# Patient Record
Sex: FEMALE | Race: WHITE | URBAN - NONMETROPOLITAN AREA
[De-identification: names, ages, dates, MRNs, and addresses within clinical notes are randomized per-mention and may not be internally consistent; named-entity substitution may affect disease eponyms.]

---

## 2017-01-16 ENCOUNTER — DOCTOR'S OFFICE (OUTPATIENT)
Dept: URBAN - NONMETROPOLITAN AREA CLINIC 1 | Facility: CLINIC | Age: 57
Setting detail: OPHTHALMOLOGY
End: 2017-01-16
Payer: COMMERCIAL

## 2017-01-16 DIAGNOSIS — H26.493: ICD-10-CM

## 2017-01-16 DIAGNOSIS — H52.4: ICD-10-CM

## 2017-01-16 DIAGNOSIS — H40.003: ICD-10-CM

## 2017-01-16 DIAGNOSIS — Z96.1: ICD-10-CM

## 2017-01-16 PROCEDURE — 92015 DETERMINE REFRACTIVE STATE: CPT | Performed by: OPTOMETRIST

## 2017-01-16 PROCEDURE — 92014 COMPRE OPH EXAM EST PT 1/>: CPT | Performed by: OPTOMETRIST

## 2017-01-16 ASSESSMENT — REFRACTION_OUTSIDERX
OS_VA2: 20/25
OS_ADD: +2.50
OD_VA1: 20/25+2
OD_ADD: +2.50
OD_VA3: 20/
OD_SPHERE: -0.25
OS_SPHERE: PLANO
OS_VA3: 20/
OS_VA1: 20/25
OD_CYLINDER: -1.25
OU_VA: 20/
OS_CYLINDER: -0.50
OS_AXIS: 090
OD_AXIS: 105
OD_VA2: 20/25

## 2017-01-16 ASSESSMENT — REFRACTION_AUTOREFRACTION
OD_CYLINDER: -1.25
OD_SPHERE: -0.25
OS_CYLINDER: 0.00
OS_SPHERE: -0.50
OD_AXIS: 113

## 2017-01-16 ASSESSMENT — REFRACTION_MANIFEST
OD_VA1: 20/
OU_VA: 20/
OS_VA1: 20/
OD_VA1: 20/
OD_VA2: 20/
OS_VA3: 20/
OS_VA2: 20/
OD_VA3: 20/
OS_VA2: 20/
OS_VA3: 20/
OD_VA3: 20/
OU_VA: 20/
OD_VA2: 20/
OS_VA1: 20/

## 2017-01-16 ASSESSMENT — REFRACTION_CURRENTRX
OS_VPRISM_DIRECTION: PROGS
OD_ADD: +2.50
OD_OVR_VA: 20/
OS_SPHERE: 0.00
OS_OVR_VA: 20/
OD_OVR_VA: 20/
OD_SPHERE: -0.50
OD_AXIS: 120
OS_OVR_VA: 20/
OS_ADD: +2.50
OS_OVR_VA: 20/
OD_OVR_VA: 20/
OD_VPRISM_DIRECTION: PROGS
OS_CYLINDER: -0.25
OD_CYLINDER: -0.50
OS_AXIS: 111

## 2017-01-16 ASSESSMENT — VISUAL ACUITY
OD_BCVA: 20/25
OS_BCVA: 20/30

## 2017-01-16 ASSESSMENT — CONFRONTATIONAL VISUAL FIELD TEST (CVF)
OD_FINDINGS: FULL
OS_FINDINGS: FULL

## 2017-01-16 ASSESSMENT — SPHEQUIV_DERIVED
OD_SPHEQUIV: -0.875
OS_SPHEQUIV: -0.5

## 2017-01-17 ENCOUNTER — OPTICAL OFFICE (OUTPATIENT)
Dept: URBAN - NONMETROPOLITAN AREA CLINIC 4 | Facility: CLINIC | Age: 57
Setting detail: OPHTHALMOLOGY
End: 2017-01-17
Payer: COMMERCIAL

## 2017-01-17 DIAGNOSIS — H52.223: ICD-10-CM

## 2017-01-17 PROCEDURE — V2020 VISION SVCS FRAMES PURCHASES: HCPCS | Performed by: OPTOMETRIST

## 2017-01-17 PROCEDURE — V2781 PROGRESSIVE LENS PER LENS: HCPCS | Performed by: OPTOMETRIST

## 2018-01-17 ENCOUNTER — DOCTOR'S OFFICE (OUTPATIENT)
Dept: URBAN - NONMETROPOLITAN AREA CLINIC 1 | Facility: CLINIC | Age: 58
Setting detail: OPHTHALMOLOGY
End: 2018-01-17
Payer: COMMERCIAL

## 2018-01-17 DIAGNOSIS — H04.123: ICD-10-CM

## 2018-01-17 DIAGNOSIS — H52.4: ICD-10-CM

## 2018-01-17 DIAGNOSIS — Z96.1: ICD-10-CM

## 2018-01-17 DIAGNOSIS — H26.493: ICD-10-CM

## 2018-01-17 DIAGNOSIS — B02.9: ICD-10-CM

## 2018-01-17 DIAGNOSIS — H40.003: ICD-10-CM

## 2018-01-17 PROCEDURE — 92015 DETERMINE REFRACTIVE STATE: CPT | Performed by: OPTOMETRIST

## 2018-01-17 PROCEDURE — 92014 COMPRE OPH EXAM EST PT 1/>: CPT | Performed by: OPTOMETRIST

## 2018-01-17 PROCEDURE — 92133 CPTRZD OPH DX IMG PST SGM ON: CPT | Performed by: OPTOMETRIST

## 2018-01-17 ASSESSMENT — REFRACTION_MANIFEST
OD_VA2: 20/
OD_VA3: 20/
OS_VA2: 20/
OD_VA2: 20/
OS_VA3: 20/
OS_VA1: 20/
OD_VA1: 20/
OU_VA: 20/
OS_VA3: 20/
OD_VA1: 20/
OS_VA2: 20/
OS_VA1: 20/
OD_VA3: 20/
OU_VA: 20/

## 2018-01-17 ASSESSMENT — REFRACTION_OUTSIDERX
OS_SPHERE: PLANO
OD_SPHERE: -0.25
OD_CYLINDER: -1.25
OD_VA1: 20/25+2
OD_VA3: 20/
OS_VA3: 20/
OD_ADD: +2.50
OD_AXIS: 105
OS_VA1: 20/25
OS_VA2: 20/25
OS_ADD: +2.50
OS_AXIS: 090
OU_VA: 20/
OD_VA2: 20/25
OS_CYLINDER: -0.50

## 2018-01-17 ASSESSMENT — VISUAL ACUITY
OS_BCVA: 20/30
OD_BCVA: 20/25

## 2018-01-17 ASSESSMENT — REFRACTION_CURRENTRX
OD_CYLINDER: -0.50
OD_AXIS: 120
OS_OVR_VA: 20/
OS_SPHERE: 0.00
OS_OVR_VA: 20/
OS_AXIS: 111
OD_OVR_VA: 20/
OS_OVR_VA: 20/
OD_SPHERE: -0.50
OS_CYLINDER: -0.25
OD_OVR_VA: 20/
OD_ADD: +2.50
OD_OVR_VA: 20/
OD_VPRISM_DIRECTION: PROGS
OS_ADD: +2.50
OS_VPRISM_DIRECTION: PROGS

## 2018-01-17 ASSESSMENT — REFRACTION_AUTOREFRACTION
OD_SPHERE: -0.25
OS_CYLINDER: SPH
OD_AXIS: 103
OS_SPHERE: -0.25
OD_CYLINDER: -1.00

## 2018-01-17 ASSESSMENT — SUPERFICIAL PUNCTATE KERATITIS (SPK)
OD_SPK: T
OS_SPK: T 1+

## 2018-01-17 ASSESSMENT — CONFRONTATIONAL VISUAL FIELD TEST (CVF)
OS_FINDINGS: FULL
OD_FINDINGS: FULL

## 2018-01-17 ASSESSMENT — TEAR BREAK UP TIME (TBUT)
OS_TBUT: 5-6 SEC
OD_TBUT: 6-7 SEC

## 2018-01-17 ASSESSMENT — SPHEQUIV_DERIVED: OD_SPHEQUIV: -0.75

## 2019-01-18 ENCOUNTER — DOCTOR'S OFFICE (OUTPATIENT)
Dept: URBAN - NONMETROPOLITAN AREA CLINIC 1 | Facility: CLINIC | Age: 59
Setting detail: OPHTHALMOLOGY
End: 2019-01-18
Payer: COMMERCIAL

## 2019-01-18 DIAGNOSIS — Z96.1: ICD-10-CM

## 2019-01-18 DIAGNOSIS — H26.493: ICD-10-CM

## 2019-01-18 DIAGNOSIS — H01.001: ICD-10-CM

## 2019-01-18 DIAGNOSIS — H04.123: ICD-10-CM

## 2019-01-18 DIAGNOSIS — H01.004: ICD-10-CM

## 2019-01-18 DIAGNOSIS — B02.9: ICD-10-CM

## 2019-01-18 DIAGNOSIS — H40.003: ICD-10-CM

## 2019-01-18 PROCEDURE — 92014 COMPRE OPH EXAM EST PT 1/>: CPT | Performed by: OPTOMETRIST

## 2019-01-18 PROCEDURE — 76514 ECHO EXAM OF EYE THICKNESS: CPT | Performed by: OPTOMETRIST

## 2019-01-18 PROCEDURE — 92083 EXTENDED VISUAL FIELD XM: CPT | Performed by: OPTOMETRIST

## 2019-01-18 ASSESSMENT — TEAR BREAK UP TIME (TBUT)
OS_TBUT: 5-6 SEC
OD_TBUT: 6-7 SEC

## 2019-01-18 ASSESSMENT — REFRACTION_MANIFEST
OD_VA1: 20/25+2
OD_AXIS: 105
OD_VA3: 20/
OS_SPHERE: PLANO
OU_VA: 20/
OD_SPHERE: -0.25
OS_VA2: 20/25
OD_VA1: 20/
OS_VA2: 20/
OS_ADD: +2.50
OS_VA3: 20/
OD_CYLINDER: -1.25
OS_CYLINDER: -0.50
OD_VA3: 20/
OS_VA1: 20/
OD_ADD: +2.50
OS_AXIS: 090
OD_VA2: 20/
OD_VA2: 20/25
OS_VA3: 20/
OS_VA1: 20/25
OU_VA: 20/

## 2019-01-18 ASSESSMENT — REFRACTION_AUTOREFRACTION
OS_AXIS: 075
OD_SPHERE: -0.75
OS_CYLINDER: -0.25
OD_AXIS: 113
OD_CYLINDER: -0.75
OS_SPHERE: -0.50

## 2019-01-18 ASSESSMENT — PACHYMETRY
OD_CT_UM: 593
OS_CT_UM: 600
OS_CT_CORRECTION: -4
OD_CT_CORRECTION: -4

## 2019-01-18 ASSESSMENT — REFRACTION_CURRENTRX
OS_CYLINDER: -0.25
OS_AXIS: 111
OS_OVR_VA: 20/
OS_ADD: +2.50
OS_OVR_VA: 20/
OD_CYLINDER: -0.50
OS_OVR_VA: 20/
OD_AXIS: 120
OD_VPRISM_DIRECTION: PROGS
OD_ADD: +2.50
OD_SPHERE: -0.50
OD_OVR_VA: 20/
OD_OVR_VA: 20/
OS_VPRISM_DIRECTION: PROGS
OD_OVR_VA: 20/
OS_SPHERE: 0.00

## 2019-01-18 ASSESSMENT — LID EXAM ASSESSMENTS
OD_BLEPHARITIS: RUL 1+
OS_BLEPHARITIS: LUL 1+

## 2019-01-18 ASSESSMENT — SPHEQUIV_DERIVED
OD_SPHEQUIV: -1.125
OS_SPHEQUIV: -0.625
OD_SPHEQUIV: -0.875

## 2019-01-18 ASSESSMENT — VISUAL ACUITY
OS_BCVA: 20/30-2
OD_BCVA: 20/25-2

## 2019-01-18 ASSESSMENT — SUPERFICIAL PUNCTATE KERATITIS (SPK)
OD_SPK: T
OS_SPK: T 1+

## 2019-01-18 ASSESSMENT — CONFRONTATIONAL VISUAL FIELD TEST (CVF)
OD_FINDINGS: FULL
OS_FINDINGS: FULL

## 2020-01-21 ENCOUNTER — DOCTOR'S OFFICE (OUTPATIENT)
Dept: URBAN - NONMETROPOLITAN AREA CLINIC 1 | Facility: CLINIC | Age: 60
Setting detail: OPHTHALMOLOGY
End: 2020-01-21
Payer: COMMERCIAL

## 2020-01-21 DIAGNOSIS — H52.4: ICD-10-CM

## 2020-01-21 DIAGNOSIS — H40.013: ICD-10-CM

## 2020-01-21 PROCEDURE — 92133 CPTRZD OPH DX IMG PST SGM ON: CPT | Performed by: OPTOMETRIST

## 2020-01-21 PROCEDURE — 92015 DETERMINE REFRACTIVE STATE: CPT | Performed by: OPTOMETRIST

## 2020-01-21 PROCEDURE — 92014 COMPRE OPH EXAM EST PT 1/>: CPT | Performed by: OPTOMETRIST

## 2020-01-21 ASSESSMENT — REFRACTION_CURRENTRX
OS_OVR_VA: 20/
OD_OVR_VA: 20/
OS_AXIS: 111
OS_VPRISM_DIRECTION: PROGS
OS_ADD: +2.50
OD_AXIS: 120
OD_VPRISM_DIRECTION: PROGS
OD_ADD: +2.50
OD_SPHERE: -0.50
OS_SPHERE: PLANO
OS_CYLINDER: -0.25
OD_CYLINDER: -0.50

## 2020-01-21 ASSESSMENT — REFRACTION_MANIFEST
OS_ADD: +2.50
OS_VA2: 20/25
OS_VA1: 20/25
OD_ADD: +2.50
OU_VA: 20/
OD_CYLINDER: -1.50
OD_VA3: 20/
OD_AXIS: 110
OS_VA3: 20/
OD_SPHERE: PLANO
OD_VA2: 20/25
OD_VA1: 20/25+2
OS_SPHERE: PLANO
OS_CYLINDER: -0.75
OS_AXIS: 065

## 2020-01-21 ASSESSMENT — SUPERFICIAL PUNCTATE KERATITIS (SPK)
OD_SPK: T
OS_SPK: T 1+

## 2020-01-21 ASSESSMENT — LID EXAM ASSESSMENTS
OD_BLEPHARITIS: RUL 1+
OS_BLEPHARITIS: LUL 1+

## 2020-01-21 ASSESSMENT — REFRACTION_AUTOREFRACTION
OD_CYLINDER: -1.50
OS_SPHERE: 0.00
OD_AXIS: 113
OD_SPHERE: 0.00
OS_CYLINDER: -0.75
OS_AXIS: 064

## 2020-01-21 ASSESSMENT — SPHEQUIV_DERIVED
OD_SPHEQUIV: -0.75
OS_SPHEQUIV: -0.375

## 2020-01-21 ASSESSMENT — TEAR BREAK UP TIME (TBUT)
OS_TBUT: 5-6 SEC
OD_TBUT: 6-7 SEC

## 2020-01-21 ASSESSMENT — VISUAL ACUITY
OD_BCVA: 20/30-2
OS_BCVA: 20/20-2

## 2020-01-21 ASSESSMENT — CONFRONTATIONAL VISUAL FIELD TEST (CVF)
OD_FINDINGS: FULL
OS_FINDINGS: FULL

## 2021-01-29 ENCOUNTER — DOCTOR'S OFFICE (OUTPATIENT)
Dept: URBAN - NONMETROPOLITAN AREA CLINIC 1 | Facility: CLINIC | Age: 61
Setting detail: OPHTHALMOLOGY
End: 2021-01-29
Payer: COMMERCIAL

## 2021-01-29 DIAGNOSIS — H01.001: ICD-10-CM

## 2021-01-29 DIAGNOSIS — H04.123: ICD-10-CM

## 2021-01-29 DIAGNOSIS — H40.013: ICD-10-CM

## 2021-01-29 DIAGNOSIS — H01.004: ICD-10-CM

## 2021-01-29 DIAGNOSIS — Z96.1: ICD-10-CM

## 2021-01-29 DIAGNOSIS — B02.9: ICD-10-CM

## 2021-01-29 DIAGNOSIS — H26.493: ICD-10-CM

## 2021-01-29 PROCEDURE — 92014 COMPRE OPH EXAM EST PT 1/>: CPT | Performed by: OPTOMETRIST

## 2021-01-29 ASSESSMENT — REFRACTION_MANIFEST
OD_CYLINDER: -1.50
OS_AXIS: 065
OS_VA2: 20/25
OD_VA2: 20/25
OD_AXIS: 110
OS_SPHERE: PLANO
OS_CYLINDER: -0.75
OS_ADD: +2.50
OD_SPHERE: PLANO
OS_VA1: 20/25
OD_VA1: 20/25+2
OD_ADD: +2.50

## 2021-01-29 ASSESSMENT — REFRACTION_CURRENTRX
OD_OVR_VA: 20/
OS_ADD: +2.50
OD_AXIS: 110
OS_SPHERE: PLANO
OD_VPRISM_DIRECTION: PROGS
OD_ADD: +2.50
OS_OVR_VA: 20/
OS_VPRISM_DIRECTION: PROGS
OD_SPHERE: PLANO
OD_CYLINDER: -1.50
OS_AXIS: 065
OS_CYLINDER: -0.75

## 2021-01-29 ASSESSMENT — PACHYMETRY
OS_CT_CORRECTION: -4
OS_CT_UM: 600
OD_CT_CORRECTION: -4
OD_CT_UM: 593

## 2021-01-29 ASSESSMENT — TONOMETRY
OS_IOP_MMHG: 17
OD_IOP_MMHG: 17

## 2021-01-29 ASSESSMENT — LID EXAM ASSESSMENTS
OS_BLEPHARITIS: LUL 1+
OD_BLEPHARITIS: RUL 1+

## 2021-01-29 ASSESSMENT — VISUAL ACUITY
OD_BCVA: 20/30-2
OS_BCVA: 20/40-2

## 2021-01-29 ASSESSMENT — REFRACTION_AUTOREFRACTION
OS_AXIS: 064
OD_CYLINDER: -1.50
OS_CYLINDER: -0.75
OD_SPHERE: 0.00
OD_AXIS: 113
OS_SPHERE: 0.00

## 2021-01-29 ASSESSMENT — SPHEQUIV_DERIVED
OD_SPHEQUIV: -0.75
OS_SPHEQUIV: -0.375

## 2021-01-29 ASSESSMENT — TEAR BREAK UP TIME (TBUT)
OS_TBUT: 5-6 SEC
OD_TBUT: 6-7 SEC

## 2021-01-29 ASSESSMENT — CONFRONTATIONAL VISUAL FIELD TEST (CVF)
OS_FINDINGS: FULL
OD_FINDINGS: FULL

## 2021-01-29 ASSESSMENT — SUPERFICIAL PUNCTATE KERATITIS (SPK)
OS_SPK: T 1+
OD_SPK: T

## 2021-08-18 ENCOUNTER — APPOINTMENT (EMERGENCY)
Dept: CT IMAGING | Facility: HOSPITAL | Age: 61
End: 2021-08-18

## 2021-08-18 ENCOUNTER — HOSPITAL ENCOUNTER (EMERGENCY)
Facility: HOSPITAL | Age: 61
Discharge: HOME | End: 2021-08-18
Attending: EMERGENCY MEDICINE | Admitting: EMERGENCY MEDICINE

## 2021-08-18 VITALS
RESPIRATION RATE: 18 BRPM | BODY MASS INDEX: 36.51 KG/M2 | TEMPERATURE: 97.4 F | SYSTOLIC BLOOD PRESSURE: 157 MMHG | WEIGHT: 219.14 LBS | OXYGEN SATURATION: 97 % | DIASTOLIC BLOOD PRESSURE: 82 MMHG | HEIGHT: 65 IN | HEART RATE: 64 BPM

## 2021-08-18 DIAGNOSIS — R07.81 RIB PAIN ON RIGHT SIDE: Primary | ICD-10-CM

## 2021-08-18 PROCEDURE — 71260 CT THORAX DX C+: CPT

## 2021-08-18 PROCEDURE — 99284 EMERGENCY DEPT VISIT MOD MDM: CPT

## 2021-08-18 PROCEDURE — 74177 CT ABD & PELVIS W/CONTRAST: CPT

## 2021-08-18 PROCEDURE — 96374 THER/PROPH/DIAG INJ IV PUSH: CPT

## 2021-08-18 PROCEDURE — 99285 EMERGENCY DEPT VISIT HI MDM: CPT | Performed by: EMERGENCY MEDICINE

## 2021-08-18 RX ORDER — MORPHINE SULFATE 15 MG/1
15 TABLET ORAL EVERY 8 HOURS PRN
Qty: 6 TABLET | Refills: 0 | Status: SHIPPED | OUTPATIENT
Start: 2021-08-18

## 2021-08-18 RX ORDER — URSODIOL 250 MG/1
TABLET, FILM COATED ORAL
COMMUNITY

## 2021-08-18 RX ORDER — MORPHINE SULFATE 4 MG/ML
4 INJECTION, SOLUTION INTRAMUSCULAR; INTRAVENOUS ONCE
Status: COMPLETED | OUTPATIENT
Start: 2021-08-18 | End: 2021-08-18

## 2021-08-18 RX ORDER — MORPHINE SULFATE 15 MG/1
15 TABLET ORAL ONCE
Status: COMPLETED | OUTPATIENT
Start: 2021-08-18 | End: 2021-08-18

## 2021-08-18 RX ORDER — ESCITALOPRAM OXALATE 10 MG/1
TABLET ORAL
COMMUNITY

## 2021-08-18 RX ADMIN — MORPHINE SULFATE 15 MG: 15 TABLET ORAL at 20:17

## 2021-08-18 RX ADMIN — MORPHINE SULFATE 4 MG: 4 INJECTION INTRAVENOUS at 19:27

## 2021-08-18 RX ADMIN — IOHEXOL 100 ML: 350 INJECTION, SOLUTION INTRAVENOUS at 19:46

## 2021-08-18 NOTE — Clinical Note
Dwaine Nathan was seen and treated in our emergency department on 8/18/2021  Diagnosis:     Amrit Burk  may return to work on return date  She may return on this date: 08/23/2021         If you have any questions or concerns, please don't hesitate to call        Darek Leo, DO    ______________________________           _______________          _______________  Hospital Representative                              Date                                Time
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
## 2021-08-19 NOTE — DISCHARGE INSTRUCTIONS
Rib Injury without definite fracture  Return immediately if worse or any new symptoms  Tylenol 1000 mg every 6 hours as needed  and/or  Advil 400 mg every 6 hours as needed  May take both together  Ice 20 minutes hourly as needed

## 2022-01-31 ENCOUNTER — DOCTOR'S OFFICE (OUTPATIENT)
Dept: URBAN - NONMETROPOLITAN AREA CLINIC 1 | Facility: CLINIC | Age: 62
Setting detail: OPHTHALMOLOGY
End: 2022-01-31
Payer: COMMERCIAL

## 2022-01-31 DIAGNOSIS — Z96.1: ICD-10-CM

## 2022-01-31 DIAGNOSIS — H01.004: ICD-10-CM

## 2022-01-31 DIAGNOSIS — H04.123: ICD-10-CM

## 2022-01-31 DIAGNOSIS — H01.001: ICD-10-CM

## 2022-01-31 DIAGNOSIS — H52.4: ICD-10-CM

## 2022-01-31 DIAGNOSIS — H40.013: ICD-10-CM

## 2022-01-31 DIAGNOSIS — B02.9: ICD-10-CM

## 2022-01-31 DIAGNOSIS — H26.493: ICD-10-CM

## 2022-01-31 PROCEDURE — 92014 COMPRE OPH EXAM EST PT 1/>: CPT | Performed by: OPTOMETRIST

## 2022-01-31 PROCEDURE — 92083 EXTENDED VISUAL FIELD XM: CPT | Performed by: OPTOMETRIST

## 2022-01-31 PROCEDURE — 76514 ECHO EXAM OF EYE THICKNESS: CPT | Performed by: OPTOMETRIST

## 2022-01-31 ASSESSMENT — REFRACTION_MANIFEST
OD_AXIS: 110
OS_SPHERE: PLANO
OS_AXIS: 065
OD_VA2: 20/25
OD_SPHERE: PLANO
OD_VA1: 20/25+2
OD_ADD: +2.50
OS_VA2: 20/25
OS_VA1: 20/25
OD_CYLINDER: -1.50
OS_ADD: +2.50
OS_CYLINDER: -0.75

## 2022-01-31 ASSESSMENT — VISUAL ACUITY
OD_BCVA: 20/25+2
OS_BCVA: 20/20

## 2022-01-31 ASSESSMENT — TONOMETRY
OS_IOP_MMHG: 17
OD_IOP_MMHG: 17

## 2022-01-31 ASSESSMENT — REFRACTION_AUTOREFRACTION
OS_SPHERE: +1.00
OS_CYLINDER: -1.00
OD_CYLINDER: -1.50
OD_AXIS: 132
OD_SPHERE: +1.50
OS_AXIS: 109

## 2022-01-31 ASSESSMENT — TEAR BREAK UP TIME (TBUT)
OS_TBUT: 5-6 SEC
OD_TBUT: 6-7 SEC

## 2022-01-31 ASSESSMENT — REFRACTION_CURRENTRX
OS_OVR_VA: 20/
OD_OVR_VA: 20/
OD_VPRISM_DIRECTION: PROGS
OS_SPHERE: PLANO
OD_AXIS: 110
OS_AXIS: 065
OS_VPRISM_DIRECTION: PROGS
OS_CYLINDER: -0.75
OS_ADD: +2.50
OD_ADD: +2.50
OD_SPHERE: PLANO
OD_CYLINDER: -1.50

## 2022-01-31 ASSESSMENT — LID EXAM ASSESSMENTS
OS_BLEPHARITIS: LUL 1+
OD_BLEPHARITIS: RUL 1+

## 2022-01-31 ASSESSMENT — CONFRONTATIONAL VISUAL FIELD TEST (CVF)
OS_FINDINGS: FULL
OD_FINDINGS: FULL

## 2022-01-31 ASSESSMENT — PACHYMETRY
OS_CT_CORRECTION: -4
OD_CT_UM: 593
OD_CT_CORRECTION: -4
OS_CT_UM: 600

## 2022-01-31 ASSESSMENT — SUPERFICIAL PUNCTATE KERATITIS (SPK)
OD_SPK: T
OS_SPK: T 1+

## 2022-01-31 ASSESSMENT — SPHEQUIV_DERIVED
OS_SPHEQUIV: 0.5
OD_SPHEQUIV: 0.75

## 2022-02-14 ENCOUNTER — HOSPITAL ENCOUNTER (OUTPATIENT)
Dept: RADIOLOGY | Facility: HOSPITAL | Age: 62
Discharge: HOME | End: 2022-02-14
Payer: COMMERCIAL

## 2022-02-14 DIAGNOSIS — K62.89 CHRONIC IDIOPATHIC ANAL PAIN: ICD-10-CM

## 2022-02-14 DIAGNOSIS — G89.29 CHRONIC PAIN OF RIGHT KNEE: ICD-10-CM

## 2022-02-14 DIAGNOSIS — M25.561 CHRONIC PAIN OF RIGHT KNEE: ICD-10-CM

## 2022-02-14 DIAGNOSIS — G89.29 CHRONIC IDIOPATHIC ANAL PAIN: ICD-10-CM

## 2022-02-14 PROCEDURE — 73562 X-RAY EXAM OF KNEE 3: CPT

## 2023-02-06 ENCOUNTER — DOCTOR'S OFFICE (OUTPATIENT)
Dept: URBAN - NONMETROPOLITAN AREA CLINIC 1 | Facility: CLINIC | Age: 63
Setting detail: OPHTHALMOLOGY
End: 2023-02-06
Payer: COMMERCIAL

## 2023-02-06 DIAGNOSIS — Z96.1: ICD-10-CM

## 2023-02-06 DIAGNOSIS — H01.001: ICD-10-CM

## 2023-02-06 DIAGNOSIS — H26.493: ICD-10-CM

## 2023-02-06 DIAGNOSIS — H40.013: ICD-10-CM

## 2023-02-06 DIAGNOSIS — H04.123: ICD-10-CM

## 2023-02-06 DIAGNOSIS — H01.004: ICD-10-CM

## 2023-02-06 PROCEDURE — 92133 CPTRZD OPH DX IMG PST SGM ON: CPT | Performed by: OPTOMETRIST

## 2023-02-06 PROCEDURE — 92014 COMPRE OPH EXAM EST PT 1/>: CPT | Performed by: OPTOMETRIST

## 2023-02-06 ASSESSMENT — CONFRONTATIONAL VISUAL FIELD TEST (CVF)
OD_FINDINGS: FULL
OS_FINDINGS: FULL

## 2023-02-06 ASSESSMENT — SPHEQUIV_DERIVED
OS_SPHEQUIV: -1.625
OD_SPHEQUIV: -1.125

## 2023-02-06 ASSESSMENT — REFRACTION_AUTOREFRACTION
OS_SPHERE: -1.25
OD_CYLINDER: -1.25
OS_CYLINDER: -0.75
OD_SPHERE: -0.50
OS_AXIS: 087
OD_AXIS: 113

## 2023-02-06 ASSESSMENT — SUPERFICIAL PUNCTATE KERATITIS (SPK)
OD_SPK: T
OS_SPK: T 1+

## 2023-02-06 ASSESSMENT — REFRACTION_MANIFEST
OD_AXIS: 110
OS_SPHERE: PLANO
OD_ADD: +2.50
OS_CYLINDER: -0.75
OS_ADD: +2.50
OS_VA1: 20/25
OD_CYLINDER: -1.50
OS_AXIS: 065
OD_VA1: 20/25+2
OD_VA2: 20/25
OD_SPHERE: PLANO
OS_VA2: 20/25

## 2023-02-06 ASSESSMENT — REFRACTION_CURRENTRX
OD_VPRISM_DIRECTION: PROGS
OS_AXIS: 065
OD_AXIS: 110
OS_CYLINDER: -0.75
OS_VPRISM_DIRECTION: PROGS
OS_SPHERE: PLANO
OS_OVR_VA: 20/
OD_OVR_VA: 20/
OD_SPHERE: PLANO
OD_CYLINDER: -1.50
OS_ADD: +2.50
OD_ADD: +2.50

## 2023-02-06 ASSESSMENT — TONOMETRY
OD_IOP_MMHG: 15
OS_IOP_MMHG: 16

## 2023-02-06 ASSESSMENT — PACHYMETRY
OS_CT_CORRECTION: -4
OD_CT_CORRECTION: -4
OS_CT_UM: 600
OD_CT_UM: 593

## 2023-02-06 ASSESSMENT — LID EXAM ASSESSMENTS
OD_BLEPHARITIS: RUL T 1+
OS_BLEPHARITIS: LUL T 1+

## 2023-02-06 ASSESSMENT — TEAR BREAK UP TIME (TBUT)
OD_TBUT: 6-7 SEC
OS_TBUT: 5-6 SEC

## 2023-02-06 ASSESSMENT — VISUAL ACUITY
OS_BCVA: 20/30-2
OD_BCVA: 20/40-1

## 2023-04-05 ENCOUNTER — DOCTOR'S OFFICE (OUTPATIENT)
Dept: URBAN - NONMETROPOLITAN AREA CLINIC 1 | Facility: CLINIC | Age: 63
Setting detail: OPHTHALMOLOGY
End: 2023-04-05
Payer: COMMERCIAL

## 2023-04-05 ENCOUNTER — RX ONLY (RX ONLY)
Age: 63
End: 2023-04-05

## 2023-04-05 DIAGNOSIS — H10.503: ICD-10-CM

## 2023-04-05 DIAGNOSIS — H04.123: ICD-10-CM

## 2023-04-05 DIAGNOSIS — H01.004: ICD-10-CM

## 2023-04-05 DIAGNOSIS — H01.001: ICD-10-CM

## 2023-04-05 DIAGNOSIS — Z96.1: ICD-10-CM

## 2023-04-05 PROCEDURE — 92012 INTRM OPH EXAM EST PATIENT: CPT | Performed by: OPHTHALMOLOGY

## 2023-04-05 RX ORDER — TOBRAMYCIN AND DEXAMETHASONE 1; 3 MG/ML; MG/ML
SUSPENSION/ DROPS OPHTHALMIC
Qty: 1 | Refills: 1 | Status: ACTIVE | OUTPATIENT

## 2023-04-05 RX ORDER — DOXYCYCLINE HYCLATE 20 MG/1
TABLET, FILM COATED ORAL
Qty: 60 | Refills: 1 | Status: ACTIVE | OUTPATIENT

## 2023-04-05 ASSESSMENT — VISUAL ACUITY
OD_BCVA: 20/60
OS_BCVA: 20/40-1

## 2023-04-05 ASSESSMENT — LID EXAM ASSESSMENTS
OD_BLEPHARITIS: RUL T 1+
OS_BLEPHARITIS: LUL T 1+

## 2023-04-05 ASSESSMENT — REFRACTION_CURRENTRX
OD_CYLINDER: -1.50
OS_SPHERE: PLANO
OD_OVR_VA: 20/
OS_VPRISM_DIRECTION: PROGS
OS_ADD: +2.50
OS_OVR_VA: 20/
OD_SPHERE: PLANO
OD_ADD: +2.50
OS_CYLINDER: -0.75
OD_AXIS: 110
OD_VPRISM_DIRECTION: PROGS
OS_AXIS: 065

## 2023-04-05 ASSESSMENT — TONOMETRY
OD_IOP_MMHG: 15
OS_IOP_MMHG: 12

## 2023-04-05 ASSESSMENT — PACHYMETRY
OD_CT_UM: 593
OS_CT_UM: 600
OS_CT_CORRECTION: -4
OD_CT_CORRECTION: -4

## 2023-04-05 ASSESSMENT — REFRACTION_MANIFEST
OS_CYLINDER: -0.75
OD_ADD: +2.50
OS_VA1: 20/25
OS_VA2: 20/25
OD_CYLINDER: -1.50
OD_VA1: 20/25+2
OS_AXIS: 065
OD_SPHERE: PLANO
OS_SPHERE: PLANO
OD_AXIS: 110
OD_VA2: 20/25
OS_ADD: +2.50

## 2023-04-05 ASSESSMENT — CONFRONTATIONAL VISUAL FIELD TEST (CVF)
OD_FINDINGS: FULL
OS_FINDINGS: FULL

## 2023-04-05 ASSESSMENT — TEAR BREAK UP TIME (TBUT): OD_TBUT: 6-7 SEC

## 2023-04-05 ASSESSMENT — SUPERFICIAL PUNCTATE KERATITIS (SPK)
OS_SPK: T 1+
OD_SPK: T

## 2023-04-05 ASSESSMENT — REFRACTION_AUTOREFRACTION
OS_CYLINDER: -0.75
OS_AXIS: 087
OD_CYLINDER: -1.25
OD_AXIS: 113
OD_SPHERE: -0.50
OS_SPHERE: -1.25

## 2023-04-05 ASSESSMENT — SPHEQUIV_DERIVED
OD_SPHEQUIV: -1.125
OS_SPHEQUIV: -1.625

## 2023-04-11 ENCOUNTER — RX ONLY (RX ONLY)
Age: 63
End: 2023-04-11

## 2023-04-11 ENCOUNTER — DOCTOR'S OFFICE (OUTPATIENT)
Dept: URBAN - NONMETROPOLITAN AREA CLINIC 1 | Facility: CLINIC | Age: 63
Setting detail: OPHTHALMOLOGY
End: 2023-04-11
Payer: COMMERCIAL

## 2023-04-11 DIAGNOSIS — H40.013: ICD-10-CM

## 2023-04-11 DIAGNOSIS — H59.032: ICD-10-CM

## 2023-04-11 DIAGNOSIS — Z96.1: ICD-10-CM

## 2023-04-11 DIAGNOSIS — H04.123: ICD-10-CM

## 2023-04-11 DIAGNOSIS — H01.004: ICD-10-CM

## 2023-04-11 DIAGNOSIS — H10.503: ICD-10-CM

## 2023-04-11 DIAGNOSIS — H40.043: ICD-10-CM

## 2023-04-11 DIAGNOSIS — H26.493: ICD-10-CM

## 2023-04-11 DIAGNOSIS — H01.001: ICD-10-CM

## 2023-04-11 PROCEDURE — 76514 ECHO EXAM OF EYE THICKNESS: CPT | Performed by: OPHTHALMOLOGY

## 2023-04-11 PROCEDURE — 92134 CPTRZ OPH DX IMG PST SGM RTA: CPT | Performed by: OPHTHALMOLOGY

## 2023-04-11 PROCEDURE — 92083 EXTENDED VISUAL FIELD XM: CPT | Performed by: OPHTHALMOLOGY

## 2023-04-11 PROCEDURE — 99214 OFFICE O/P EST MOD 30 MIN: CPT | Performed by: OPHTHALMOLOGY

## 2023-04-11 RX ORDER — KETOROLAC TROMETHAMINE 5 MG/ML
SOLUTION OPHTHALMIC
Qty: 7.5 | Refills: 3 | Status: ACTIVE | OUTPATIENT

## 2023-04-11 RX ORDER — DORZOLAMIDE HYDROCHLORIDE 20 MG/ML
SOLUTION/ DROPS OPHTHALMIC
Qty: 1 | Refills: 3 | Status: ACTIVE | OUTPATIENT

## 2023-04-11 RX ORDER — BROMFENAC 0.9 MG/ML
SOLUTION/ DROPS OPHTHALMIC
Qty: 1 | Refills: 3 | Status: ACTIVE | OUTPATIENT

## 2023-04-11 ASSESSMENT — REFRACTION_AUTOREFRACTION
OS_CYLINDER: -1.25
OS_AXIS: 055
OD_CYLINDER: -1.75
OS_SPHERE: -0.50
OD_AXIS: 109
OD_SPHERE: 0.00

## 2023-04-11 ASSESSMENT — REFRACTION_MANIFEST
OS_SPHERE: PLANO
OD_VA2: 20/25
OS_VA1: 20/25
OS_CYLINDER: -0.75
OD_AXIS: 110
OS_ADD: +2.50
OD_SPHERE: PLANO
OD_ADD: +2.50
OS_AXIS: 065
OD_VA1: 20/25+2
OD_CYLINDER: -1.50
OS_VA2: 20/25

## 2023-04-11 ASSESSMENT — CONFRONTATIONAL VISUAL FIELD TEST (CVF)
OS_FINDINGS: FULL
OD_FINDINGS: FULL

## 2023-04-11 ASSESSMENT — PACHYMETRY
OD_CT_UM: 593
OS_CT_UM: 600
OD_CT_CORRECTION: -4
OS_CT_CORRECTION: -4

## 2023-04-11 ASSESSMENT — SPHEQUIV_DERIVED
OD_SPHEQUIV: -0.875
OS_SPHEQUIV: -1.125

## 2023-04-11 ASSESSMENT — REFRACTION_CURRENTRX
OD_OVR_VA: 20/
OD_ADD: +2.50
OS_VPRISM_DIRECTION: PROGS
OD_VPRISM_DIRECTION: PROGS
OD_CYLINDER: -1.50
OS_AXIS: 065
OS_CYLINDER: -0.75
OS_SPHERE: PLANO
OS_ADD: +2.50
OD_SPHERE: PLANO
OS_OVR_VA: 20/
OD_AXIS: 110

## 2023-04-11 ASSESSMENT — SUPERFICIAL PUNCTATE KERATITIS (SPK)
OD_SPK: T
OS_SPK: T 1+

## 2023-04-11 ASSESSMENT — VISUAL ACUITY
OS_BCVA: 20/20-1
OD_BCVA: 20/80-1

## 2023-04-11 ASSESSMENT — LID EXAM ASSESSMENTS
OD_BLEPHARITIS: RUL T 1+
OS_BLEPHARITIS: LUL T 1+

## 2023-04-11 ASSESSMENT — TONOMETRY: OD_IOP_MMHG: 18

## 2023-04-11 ASSESSMENT — TEAR BREAK UP TIME (TBUT): OD_TBUT: 6-7 SEC

## 2023-04-13 ENCOUNTER — DOCTOR'S OFFICE (OUTPATIENT)
Dept: URBAN - NONMETROPOLITAN AREA CLINIC 1 | Facility: CLINIC | Age: 63
Setting detail: OPHTHALMOLOGY
End: 2023-04-13
Payer: COMMERCIAL

## 2023-04-13 ENCOUNTER — RX ONLY (RX ONLY)
Age: 63
End: 2023-04-13

## 2023-04-13 DIAGNOSIS — H04.123: ICD-10-CM

## 2023-04-13 DIAGNOSIS — H35.81: ICD-10-CM

## 2023-04-13 DIAGNOSIS — H59.032: ICD-10-CM

## 2023-04-13 DIAGNOSIS — H40.013: ICD-10-CM

## 2023-04-13 PROCEDURE — 92250 FUNDUS PHOTOGRAPHY W/I&R: CPT | Performed by: OPHTHALMOLOGY

## 2023-04-13 PROCEDURE — 99214 OFFICE O/P EST MOD 30 MIN: CPT | Performed by: OPHTHALMOLOGY

## 2023-04-13 PROCEDURE — 92235 FLUORESCEIN ANGRPH MLTIFRAME: CPT | Performed by: OPHTHALMOLOGY

## 2023-04-13 ASSESSMENT — REFRACTION_AUTOREFRACTION
OD_AXIS: 109
OS_SPHERE: -0.50
OS_AXIS: 055
OD_CYLINDER: -1.75
OD_SPHERE: 0.00
OS_CYLINDER: -1.25

## 2023-04-13 ASSESSMENT — REFRACTION_CURRENTRX
OD_SPHERE: PLANO
OS_SPHERE: PLANO
OS_CYLINDER: -0.75
OS_ADD: +2.50
OS_VPRISM_DIRECTION: PROGS
OS_AXIS: 065
OS_OVR_VA: 20/
OD_CYLINDER: -1.50
OD_ADD: +2.50
OD_VPRISM_DIRECTION: PROGS
OD_OVR_VA: 20/
OD_AXIS: 110

## 2023-04-13 ASSESSMENT — LID EXAM ASSESSMENTS
OD_BLEPHARITIS: RUL T 1+
OS_BLEPHARITIS: LUL T 1+

## 2023-04-13 ASSESSMENT — SPHEQUIV_DERIVED
OD_SPHEQUIV: -0.875
OS_SPHEQUIV: -1.125

## 2023-04-13 ASSESSMENT — REFRACTION_MANIFEST
OS_AXIS: 065
OD_CYLINDER: -1.50
OD_ADD: +2.50
OD_VA2: 20/25
OD_VA1: 20/25+2
OS_SPHERE: PLANO
OS_VA2: 20/25
OD_SPHERE: PLANO
OS_ADD: +2.50
OS_CYLINDER: -0.75
OD_AXIS: 110
OS_VA1: 20/25

## 2023-04-13 ASSESSMENT — SUPERFICIAL PUNCTATE KERATITIS (SPK)
OD_SPK: T
OS_SPK: T 1+

## 2023-04-13 ASSESSMENT — TEAR BREAK UP TIME (TBUT): OD_TBUT: 6-7 SEC

## 2023-04-13 ASSESSMENT — CONFRONTATIONAL VISUAL FIELD TEST (CVF)
OD_FINDINGS: FULL
OS_FINDINGS: FULL

## 2023-04-13 ASSESSMENT — VISUAL ACUITY
OD_BCVA: 20/70
OS_BCVA: 20/20

## 2023-04-18 ENCOUNTER — DOCTOR'S OFFICE (OUTPATIENT)
Dept: URBAN - NONMETROPOLITAN AREA CLINIC 1 | Facility: CLINIC | Age: 63
Setting detail: OPHTHALMOLOGY
End: 2023-04-18
Payer: COMMERCIAL

## 2023-04-18 ENCOUNTER — RX ONLY (RX ONLY)
Age: 63
End: 2023-04-18

## 2023-04-18 DIAGNOSIS — H04.123: ICD-10-CM

## 2023-04-18 DIAGNOSIS — H35.81: ICD-10-CM

## 2023-04-18 DIAGNOSIS — H40.012: ICD-10-CM

## 2023-04-18 DIAGNOSIS — H59.032: ICD-10-CM

## 2023-04-18 PROCEDURE — 99213 OFFICE O/P EST LOW 20 MIN: CPT | Performed by: OPHTHALMOLOGY

## 2023-04-18 PROCEDURE — 92133 CPTRZD OPH DX IMG PST SGM ON: CPT | Performed by: OPHTHALMOLOGY

## 2023-04-18 ASSESSMENT — REFRACTION_CURRENTRX
OD_VPRISM_DIRECTION: PROGS
OS_SPHERE: PLANO
OS_VPRISM_DIRECTION: PROGS
OD_AXIS: 110
OD_CYLINDER: -1.50
OD_ADD: +2.50
OD_OVR_VA: 20/
OS_AXIS: 065
OS_OVR_VA: 20/
OS_CYLINDER: -0.75
OS_ADD: +2.50
OD_SPHERE: PLANO

## 2023-04-18 ASSESSMENT — VISUAL ACUITY
OS_BCVA: 20/20-2
OD_BCVA: 20/50-2

## 2023-04-18 ASSESSMENT — REFRACTION_MANIFEST
OD_VA2: 20/25
OS_CYLINDER: -0.75
OS_VA2: 20/25
OS_AXIS: 065
OS_ADD: +2.50
OD_SPHERE: PLANO
OD_AXIS: 110
OS_SPHERE: PLANO
OD_ADD: +2.50
OS_VA1: 20/25
OD_VA1: 20/25+2
OD_CYLINDER: -1.50

## 2023-04-18 ASSESSMENT — SPHEQUIV_DERIVED
OS_SPHEQUIV: -1.125
OD_SPHEQUIV: -0.875

## 2023-04-18 ASSESSMENT — CONFRONTATIONAL VISUAL FIELD TEST (CVF)
OD_FINDINGS: FULL
OS_FINDINGS: FULL

## 2023-04-18 ASSESSMENT — TONOMETRY: OD_IOP_MMHG: 17

## 2023-04-18 ASSESSMENT — PACHYMETRY
OD_CT_UM: 593
OD_CT_CORRECTION: -4
OS_CT_UM: 600
OS_CT_CORRECTION: -4

## 2023-04-18 ASSESSMENT — REFRACTION_AUTOREFRACTION
OD_AXIS: 109
OS_AXIS: 055
OS_CYLINDER: -1.25
OD_CYLINDER: -1.75
OD_SPHERE: 0.00
OS_SPHERE: -0.50

## 2023-04-21 ENCOUNTER — RX ONLY (RX ONLY)
Age: 63
End: 2023-04-21

## 2023-04-21 ENCOUNTER — DOCTOR'S OFFICE (OUTPATIENT)
Dept: URBAN - NONMETROPOLITAN AREA CLINIC 1 | Facility: CLINIC | Age: 63
Setting detail: OPHTHALMOLOGY
End: 2023-04-21
Payer: COMMERCIAL

## 2023-04-21 DIAGNOSIS — H04.123: ICD-10-CM

## 2023-04-21 DIAGNOSIS — H59.032: ICD-10-CM

## 2023-04-21 DIAGNOSIS — H40.012: ICD-10-CM

## 2023-04-21 DIAGNOSIS — H35.81: ICD-10-CM

## 2023-04-21 PROCEDURE — 99212 OFFICE O/P EST SF 10 MIN: CPT | Performed by: OPHTHALMOLOGY

## 2023-04-21 ASSESSMENT — REFRACTION_MANIFEST
OS_AXIS: 065
OD_SPHERE: PLANO
OS_VA1: 20/25
OS_VA2: 20/25
OD_ADD: +2.50
OS_SPHERE: PLANO
OD_VA2: 20/25
OD_AXIS: 110
OD_VA1: 20/25+2
OD_CYLINDER: -1.50
OS_ADD: +2.50
OS_CYLINDER: -0.75

## 2023-04-21 ASSESSMENT — REFRACTION_CURRENTRX
OD_VPRISM_DIRECTION: PROGS
OS_SPHERE: PLANO
OD_OVR_VA: 20/
OS_ADD: +2.50
OD_ADD: +2.50
OD_SPHERE: PLANO
OD_CYLINDER: -1.50
OS_VPRISM_DIRECTION: PROGS
OS_OVR_VA: 20/
OS_CYLINDER: -0.75
OD_AXIS: 110
OS_AXIS: 065

## 2023-04-21 ASSESSMENT — SPHEQUIV_DERIVED
OD_SPHEQUIV: -0.875
OS_SPHEQUIV: -1.125

## 2023-04-21 ASSESSMENT — SUPERFICIAL PUNCTATE KERATITIS (SPK)
OD_SPK: T
OS_SPK: T 1+

## 2023-04-21 ASSESSMENT — REFRACTION_AUTOREFRACTION
OS_SPHERE: -0.50
OD_AXIS: 109
OS_AXIS: 055
OD_SPHERE: 0.00
OS_CYLINDER: -1.25
OD_CYLINDER: -1.75

## 2023-04-21 ASSESSMENT — LID EXAM ASSESSMENTS
OD_BLEPHARITIS: RUL T 1+
OS_BLEPHARITIS: LUL T 1+

## 2023-04-21 ASSESSMENT — TEAR BREAK UP TIME (TBUT): OD_TBUT: 6-7 SEC

## 2023-04-21 ASSESSMENT — VISUAL ACUITY
OD_BCVA: 20/50-1
OS_BCVA: 20/20-1

## 2023-04-21 ASSESSMENT — CONFRONTATIONAL VISUAL FIELD TEST (CVF)
OS_FINDINGS: FULL
OD_FINDINGS: FULL

## 2023-04-24 ENCOUNTER — DOCTOR'S OFFICE (OUTPATIENT)
Dept: URBAN - NONMETROPOLITAN AREA CLINIC 1 | Facility: CLINIC | Age: 63
Setting detail: OPHTHALMOLOGY
End: 2023-04-24
Payer: COMMERCIAL

## 2023-04-24 DIAGNOSIS — E11.3391: ICD-10-CM

## 2023-04-24 DIAGNOSIS — H04.123: ICD-10-CM

## 2023-04-24 DIAGNOSIS — E11.3312: ICD-10-CM

## 2023-04-24 PROCEDURE — 99213 OFFICE O/P EST LOW 20 MIN: CPT | Performed by: OPHTHALMOLOGY

## 2023-04-24 PROCEDURE — 67028 INJECTION EYE DRUG: CPT | Performed by: OPHTHALMOLOGY

## 2023-04-24 ASSESSMENT — CONFRONTATIONAL VISUAL FIELD TEST (CVF)
OD_FINDINGS: FULL
OS_FINDINGS: FULL

## 2023-04-26 ENCOUNTER — RX ONLY (RX ONLY)
Age: 63
End: 2023-04-26

## 2023-04-26 ASSESSMENT — REFRACTION_CURRENTRX
OD_AXIS: 110
OD_VPRISM_DIRECTION: PROGS
OS_CYLINDER: -0.75
OD_CYLINDER: -1.50
OS_AXIS: 065
OD_ADD: +2.50
OD_SPHERE: PLANO
OS_ADD: +2.50
OD_OVR_VA: 20/
OS_VPRISM_DIRECTION: PROGS
OS_SPHERE: PLANO
OS_OVR_VA: 20/

## 2023-04-26 ASSESSMENT — LID EXAM ASSESSMENTS
OS_BLEPHARITIS: LUL T 1+
OD_BLEPHARITIS: RUL T 1+

## 2023-04-26 ASSESSMENT — REFRACTION_MANIFEST
OD_AXIS: 110
OD_VA1: 20/25+2
OS_SPHERE: PLANO
OS_VA2: 20/25
OD_CYLINDER: -1.50
OD_ADD: +2.50
OD_SPHERE: PLANO
OS_VA1: 20/25
OS_AXIS: 065
OS_ADD: +2.50
OD_VA2: 20/25
OS_CYLINDER: -0.75

## 2023-04-26 ASSESSMENT — SUPERFICIAL PUNCTATE KERATITIS (SPK)
OS_SPK: T 1+
OD_SPK: T

## 2023-04-26 ASSESSMENT — REFRACTION_AUTOREFRACTION
OS_CYLINDER: -1.25
OS_AXIS: 055
OD_AXIS: 109
OS_SPHERE: -0.50
OD_SPHERE: 0.00
OD_CYLINDER: -1.75

## 2023-04-26 ASSESSMENT — SPHEQUIV_DERIVED
OS_SPHEQUIV: -1.125
OD_SPHEQUIV: -0.875

## 2023-04-26 ASSESSMENT — TEAR BREAK UP TIME (TBUT): OD_TBUT: 6-7 SEC

## 2023-04-26 ASSESSMENT — VISUAL ACUITY
OS_BCVA: 20/20-1
OD_BCVA: 20/60

## 2023-04-27 ENCOUNTER — DOCTOR'S OFFICE (OUTPATIENT)
Dept: URBAN - NONMETROPOLITAN AREA CLINIC 1 | Facility: CLINIC | Age: 63
Setting detail: OPHTHALMOLOGY
End: 2023-04-27
Payer: COMMERCIAL

## 2023-04-27 DIAGNOSIS — E11.3391: ICD-10-CM

## 2023-04-27 DIAGNOSIS — E11.3312: ICD-10-CM

## 2023-04-27 DIAGNOSIS — H35.81: ICD-10-CM

## 2023-04-27 DIAGNOSIS — H59.032: ICD-10-CM

## 2023-04-27 PROCEDURE — 99214 OFFICE O/P EST MOD 30 MIN: CPT | Performed by: OPHTHALMOLOGY

## 2023-04-27 ASSESSMENT — REFRACTION_CURRENTRX
OS_OVR_VA: 20/
OD_ADD: +2.50
OD_OVR_VA: 20/
OD_AXIS: 110
OD_CYLINDER: -1.50
OS_SPHERE: PLANO
OS_AXIS: 065
OS_CYLINDER: -0.75
OS_VPRISM_DIRECTION: PROGS
OS_ADD: +2.50
OD_VPRISM_DIRECTION: PROGS
OD_SPHERE: PLANO

## 2023-04-27 ASSESSMENT — REFRACTION_AUTOREFRACTION
OS_AXIS: 055
OD_AXIS: 109
OS_SPHERE: -0.50
OS_CYLINDER: -1.25
OD_CYLINDER: -1.75
OD_SPHERE: 0.00

## 2023-04-27 ASSESSMENT — VISUAL ACUITY
OD_BCVA: 20/80
OS_BCVA: 20/25

## 2023-04-27 ASSESSMENT — SPHEQUIV_DERIVED
OS_SPHEQUIV: -1.125
OD_SPHEQUIV: -0.875

## 2023-04-27 ASSESSMENT — CONFRONTATIONAL VISUAL FIELD TEST (CVF)
OD_FINDINGS: FULL
OS_FINDINGS: FULL

## 2023-04-27 ASSESSMENT — REFRACTION_MANIFEST
OS_SPHERE: PLANO
OD_CYLINDER: -1.50
OS_VA2: 20/25
OD_VA1: 20/25+2
OS_ADD: +2.50
OS_CYLINDER: -0.75
OD_ADD: +2.50
OD_VA2: 20/25
OD_SPHERE: PLANO
OS_VA1: 20/25
OD_AXIS: 110
OS_AXIS: 065

## 2023-04-27 ASSESSMENT — SUPERFICIAL PUNCTATE KERATITIS (SPK)
OD_SPK: T
OS_SPK: T 1+

## 2023-04-27 ASSESSMENT — LID EXAM ASSESSMENTS
OD_BLEPHARITIS: RUL T 1+
OS_BLEPHARITIS: LUL T 1+

## 2023-04-27 ASSESSMENT — TEAR BREAK UP TIME (TBUT): OD_TBUT: 6-7 SEC

## 2023-04-28 ENCOUNTER — DOCTOR'S OFFICE (OUTPATIENT)
Dept: URBAN - NONMETROPOLITAN AREA CLINIC 1 | Facility: CLINIC | Age: 63
Setting detail: OPHTHALMOLOGY
End: 2023-04-28
Payer: COMMERCIAL

## 2023-04-28 ENCOUNTER — RX ONLY (RX ONLY)
Age: 63
End: 2023-04-28

## 2023-04-28 DIAGNOSIS — E11.3391: ICD-10-CM

## 2023-04-28 DIAGNOSIS — Z96.1: ICD-10-CM

## 2023-04-28 DIAGNOSIS — H04.123: ICD-10-CM

## 2023-04-28 DIAGNOSIS — H35.81: ICD-10-CM

## 2023-04-28 DIAGNOSIS — H26.493: ICD-10-CM

## 2023-04-28 DIAGNOSIS — H40.012: ICD-10-CM

## 2023-04-28 DIAGNOSIS — E11.3312: ICD-10-CM

## 2023-04-28 DIAGNOSIS — H59.032: ICD-10-CM

## 2023-04-28 PROBLEM — H10.503 BLEPHAROCONJUNCTIVITS NOS; BOTH EYES: Status: ACTIVE | Noted: 2023-04-05

## 2023-04-28 PROBLEM — H40.043: Status: ACTIVE | Noted: 2023-04-11

## 2023-04-28 PROCEDURE — 99213 OFFICE O/P EST LOW 20 MIN: CPT | Performed by: OPHTHALMOLOGY

## 2023-04-28 ASSESSMENT — VISUAL ACUITY
OD_BCVA: 20/50-1
OS_BCVA: 20/25

## 2023-04-28 ASSESSMENT — REFRACTION_CURRENTRX
OS_VPRISM_DIRECTION: PROGS
OD_SPHERE: PLANO
OD_VPRISM_DIRECTION: PROGS
OD_OVR_VA: 20/
OS_CYLINDER: -0.75
OD_CYLINDER: -1.50
OD_ADD: +2.50
OD_AXIS: 110
OS_SPHERE: PLANO
OS_AXIS: 065
OS_OVR_VA: 20/
OS_ADD: +2.50

## 2023-04-28 ASSESSMENT — PACHYMETRY
OS_CT_CORRECTION: -4
OD_CT_CORRECTION: -4
OS_CT_UM: 600
OD_CT_UM: 593

## 2023-04-28 ASSESSMENT — REFRACTION_AUTOREFRACTION
OD_SPHERE: 0.00
OS_CYLINDER: -1.25
OD_CYLINDER: -1.75
OS_AXIS: 055
OD_AXIS: 109
OS_SPHERE: -0.50

## 2023-04-28 ASSESSMENT — REFRACTION_MANIFEST
OD_VA1: 20/25+2
OD_SPHERE: PLANO
OS_VA2: 20/25
OS_SPHERE: PLANO
OD_CYLINDER: -1.50
OS_VA1: 20/25
OD_VA2: 20/25
OS_ADD: +2.50
OS_CYLINDER: -0.75
OS_AXIS: 065
OD_ADD: +2.50
OD_AXIS: 110

## 2023-04-28 ASSESSMENT — SUPERFICIAL PUNCTATE KERATITIS (SPK)
OD_SPK: T
OS_SPK: T 1+

## 2023-04-28 ASSESSMENT — TEAR BREAK UP TIME (TBUT): OD_TBUT: 6-7 SEC

## 2023-04-28 ASSESSMENT — SPHEQUIV_DERIVED
OS_SPHEQUIV: -1.125
OD_SPHEQUIV: -0.875

## 2023-04-28 ASSESSMENT — LID EXAM ASSESSMENTS
OS_BLEPHARITIS: LUL T 1+
OD_BLEPHARITIS: RUL T 1+

## 2023-04-28 ASSESSMENT — TONOMETRY
OS_IOP_MMHG: 16
OD_IOP_MMHG: 12

## 2023-05-22 ENCOUNTER — DOCTOR'S OFFICE (OUTPATIENT)
Dept: URBAN - NONMETROPOLITAN AREA CLINIC 1 | Facility: CLINIC | Age: 63
Setting detail: OPHTHALMOLOGY
End: 2023-05-22
Payer: COMMERCIAL

## 2023-05-22 DIAGNOSIS — H59.032: ICD-10-CM

## 2023-05-22 DIAGNOSIS — H04.123: ICD-10-CM

## 2023-05-22 DIAGNOSIS — H35.81: ICD-10-CM

## 2023-05-22 DIAGNOSIS — H40.012: ICD-10-CM

## 2023-05-22 DIAGNOSIS — E11.3391: ICD-10-CM

## 2023-05-22 DIAGNOSIS — E11.3312: ICD-10-CM

## 2023-05-22 PROCEDURE — 92134 CPTRZ OPH DX IMG PST SGM RTA: CPT | Performed by: OPHTHALMOLOGY

## 2023-05-22 PROCEDURE — 99214 OFFICE O/P EST MOD 30 MIN: CPT | Performed by: OPHTHALMOLOGY

## 2023-05-22 ASSESSMENT — REFRACTION_MANIFEST
OS_CYLINDER: -0.75
OD_VA1: 20/25+2
OD_AXIS: 110
OD_SPHERE: PLANO
OS_SPHERE: PLANO
OS_VA1: 20/25
OS_VA2: 20/25
OD_CYLINDER: -1.50
OS_AXIS: 065
OS_ADD: +2.50
OD_VA2: 20/25
OD_ADD: +2.50

## 2023-05-22 ASSESSMENT — TEAR BREAK UP TIME (TBUT): OD_TBUT: 6-7 SEC

## 2023-05-22 ASSESSMENT — REFRACTION_CURRENTRX
OD_OVR_VA: 20/
OS_CYLINDER: -0.75
OS_ADD: +2.50
OD_CYLINDER: -1.50
OD_SPHERE: PLANO
OD_ADD: +2.50
OS_SPHERE: PLANO
OS_OVR_VA: 20/
OD_AXIS: 110
OS_VPRISM_DIRECTION: PROGS
OS_AXIS: 065
OD_VPRISM_DIRECTION: PROGS

## 2023-05-22 ASSESSMENT — LID EXAM ASSESSMENTS
OS_BLEPHARITIS: LUL T 1+
OD_BLEPHARITIS: RUL T 1+

## 2023-05-22 ASSESSMENT — REFRACTION_AUTOREFRACTION
OD_AXIS: 109
OD_CYLINDER: -1.75
OS_CYLINDER: -1.25
OD_SPHERE: 0.00
OS_AXIS: 055
OS_SPHERE: -0.50

## 2023-05-22 ASSESSMENT — CONFRONTATIONAL VISUAL FIELD TEST (CVF)
OS_FINDINGS: FULL
OD_FINDINGS: FULL

## 2023-05-22 ASSESSMENT — SPHEQUIV_DERIVED
OS_SPHEQUIV: -1.125
OD_SPHEQUIV: -0.875

## 2023-05-22 ASSESSMENT — VISUAL ACUITY
OS_BCVA: 20/30-2
OD_BCVA: 20/100

## 2023-05-22 ASSESSMENT — SUPERFICIAL PUNCTATE KERATITIS (SPK)
OD_SPK: T
OS_SPK: T 1+

## 2023-06-05 ENCOUNTER — DOCTOR'S OFFICE (OUTPATIENT)
Dept: URBAN - NONMETROPOLITAN AREA CLINIC 1 | Facility: CLINIC | Age: 63
Setting detail: OPHTHALMOLOGY
End: 2023-06-05
Payer: COMMERCIAL

## 2023-06-05 DIAGNOSIS — H35.3221: ICD-10-CM

## 2023-06-05 PROCEDURE — 67028 INJECTION EYE DRUG: CPT | Performed by: OPHTHALMOLOGY

## 2023-06-06 ASSESSMENT — SPHEQUIV_DERIVED
OD_SPHEQUIV: -0.875
OS_SPHEQUIV: -1.125

## 2023-06-06 ASSESSMENT — REFRACTION_AUTOREFRACTION
OS_AXIS: 055
OD_AXIS: 109
OS_SPHERE: -0.50
OS_CYLINDER: -1.25
OD_CYLINDER: -1.75
OD_SPHERE: 0.00

## 2023-06-06 ASSESSMENT — VISUAL ACUITY
OD_BCVA: 20/150
OS_BCVA: 20/30-2

## 2023-06-19 ENCOUNTER — DOCTOR'S OFFICE (OUTPATIENT)
Dept: URBAN - NONMETROPOLITAN AREA CLINIC 1 | Facility: CLINIC | Age: 63
Setting detail: OPHTHALMOLOGY
End: 2023-06-19
Payer: COMMERCIAL

## 2023-06-19 DIAGNOSIS — H35.3221: ICD-10-CM

## 2023-06-19 DIAGNOSIS — H04.123: ICD-10-CM

## 2023-06-19 DIAGNOSIS — H35.81: ICD-10-CM

## 2023-06-19 DIAGNOSIS — H40.012: ICD-10-CM

## 2023-06-19 PROCEDURE — 92134 CPTRZ OPH DX IMG PST SGM RTA: CPT | Performed by: OPHTHALMOLOGY

## 2023-06-19 PROCEDURE — 99213 OFFICE O/P EST LOW 20 MIN: CPT | Performed by: OPHTHALMOLOGY

## 2023-06-19 ASSESSMENT — REFRACTION_AUTOREFRACTION
OS_CYLINDER: -1.25
OD_AXIS: 109
OD_SPHERE: 0.00
OS_SPHERE: -0.50
OD_CYLINDER: -1.75
OS_AXIS: 055

## 2023-06-19 ASSESSMENT — SPHEQUIV_DERIVED
OD_SPHEQUIV: -0.875
OS_SPHEQUIV: -1.125

## 2023-06-19 ASSESSMENT — LID EXAM ASSESSMENTS
OS_BLEPHARITIS: LUL T 1+
OD_BLEPHARITIS: RUL T 1+

## 2023-06-19 ASSESSMENT — VISUAL ACUITY
OD_BCVA: 20/150
OS_BCVA: 20/20

## 2023-06-19 ASSESSMENT — CONFRONTATIONAL VISUAL FIELD TEST (CVF)
OD_FINDINGS: FULL
OS_FINDINGS: FULL

## 2023-06-19 ASSESSMENT — TEAR BREAK UP TIME (TBUT): OD_TBUT: 6-7 SEC

## 2023-06-19 ASSESSMENT — SUPERFICIAL PUNCTATE KERATITIS (SPK)
OS_SPK: T 1+
OD_SPK: T

## 2023-07-10 ENCOUNTER — DOCTOR'S OFFICE (OUTPATIENT)
Dept: URBAN - NONMETROPOLITAN AREA CLINIC 1 | Facility: CLINIC | Age: 63
Setting detail: OPHTHALMOLOGY
End: 2023-07-10
Payer: COMMERCIAL

## 2023-07-10 DIAGNOSIS — H35.3221: ICD-10-CM

## 2023-07-10 PROCEDURE — 67028 INJECTION EYE DRUG: CPT | Performed by: OPHTHALMOLOGY

## 2023-07-11 ASSESSMENT — SPHEQUIV_DERIVED
OS_SPHEQUIV: -1.125
OD_SPHEQUIV: -0.875

## 2023-07-11 ASSESSMENT — REFRACTION_AUTOREFRACTION
OD_CYLINDER: -1.75
OD_SPHERE: 0.00
OD_AXIS: 109
OS_SPHERE: -0.50
OS_CYLINDER: -1.25
OS_AXIS: 055

## 2023-07-11 ASSESSMENT — VISUAL ACUITY
OS_BCVA: 20/20
OD_BCVA: 20/50-2

## 2023-07-24 ENCOUNTER — DOCTOR'S OFFICE (OUTPATIENT)
Dept: URBAN - NONMETROPOLITAN AREA CLINIC 1 | Facility: CLINIC | Age: 63
Setting detail: OPHTHALMOLOGY
End: 2023-07-24
Payer: COMMERCIAL

## 2023-07-24 DIAGNOSIS — H04.123: ICD-10-CM

## 2023-07-24 DIAGNOSIS — H35.3221: ICD-10-CM

## 2023-07-24 DIAGNOSIS — H40.043: ICD-10-CM

## 2023-07-24 DIAGNOSIS — H40.012: ICD-10-CM

## 2023-07-24 DIAGNOSIS — H35.81: ICD-10-CM

## 2023-07-24 PROCEDURE — 92134 CPTRZ OPH DX IMG PST SGM RTA: CPT | Performed by: OPHTHALMOLOGY

## 2023-07-24 PROCEDURE — 99213 OFFICE O/P EST LOW 20 MIN: CPT | Performed by: OPHTHALMOLOGY

## 2023-07-24 ASSESSMENT — REFRACTION_AUTOREFRACTION
OS_AXIS: 055
OD_SPHERE: 0.00
OS_SPHERE: -0.50
OS_CYLINDER: -1.25
OD_CYLINDER: -1.75
OD_AXIS: 109

## 2023-07-24 ASSESSMENT — LID EXAM ASSESSMENTS
OS_BLEPHARITIS: LUL T 1+
OD_BLEPHARITIS: RUL T 1+

## 2023-07-24 ASSESSMENT — SUPERFICIAL PUNCTATE KERATITIS (SPK)
OS_SPK: T 1+
OD_SPK: T

## 2023-07-24 ASSESSMENT — SPHEQUIV_DERIVED
OD_SPHEQUIV: -0.875
OS_SPHEQUIV: -1.125

## 2023-07-24 ASSESSMENT — VISUAL ACUITY
OS_BCVA: 20/20
OD_BCVA: 20/70-2

## 2023-07-24 ASSESSMENT — TEAR BREAK UP TIME (TBUT): OD_TBUT: 6-7 SEC

## 2023-08-03 ENCOUNTER — DOCTOR'S OFFICE (OUTPATIENT)
Dept: URBAN - NONMETROPOLITAN AREA CLINIC 1 | Facility: CLINIC | Age: 63
Setting detail: OPHTHALMOLOGY
End: 2023-08-03
Payer: COMMERCIAL

## 2023-08-03 DIAGNOSIS — H35.3221: ICD-10-CM

## 2023-08-03 DIAGNOSIS — H40.012: ICD-10-CM

## 2023-08-03 DIAGNOSIS — H35.81: ICD-10-CM

## 2023-08-03 PROBLEM — H26.40 POSTERIOR CAPSULAR OPACIFICATION ; BOTH EYES: Status: ACTIVE | Noted: 2023-07-24

## 2023-08-03 PROCEDURE — 92240 ICG ANGIOGRAPHY I&R UNI/BI: CPT | Performed by: OPHTHALMOLOGY

## 2023-08-03 PROCEDURE — 99213 OFFICE O/P EST LOW 20 MIN: CPT | Performed by: OPHTHALMOLOGY

## 2023-08-03 ASSESSMENT — SPHEQUIV_DERIVED
OS_SPHEQUIV: -1.125
OD_SPHEQUIV: -0.875

## 2023-08-03 ASSESSMENT — SUPERFICIAL PUNCTATE KERATITIS (SPK)
OD_SPK: T
OS_SPK: T 1+

## 2023-08-03 ASSESSMENT — VISUAL ACUITY
OS_BCVA: 20/20-1
OD_BCVA: 20/60-2

## 2023-08-03 ASSESSMENT — REFRACTION_AUTOREFRACTION
OD_AXIS: 109
OS_SPHERE: -0.50
OS_AXIS: 055
OD_CYLINDER: -1.75
OS_CYLINDER: -1.25
OD_SPHERE: 0.00

## 2023-08-03 ASSESSMENT — LID EXAM ASSESSMENTS
OS_BLEPHARITIS: LUL T 1+
OD_BLEPHARITIS: RUL T 1+

## 2023-08-03 ASSESSMENT — TEAR BREAK UP TIME (TBUT): OD_TBUT: 6-7 SEC

## 2023-08-10 ENCOUNTER — DOCTOR'S OFFICE (OUTPATIENT)
Dept: URBAN - NONMETROPOLITAN AREA CLINIC 1 | Facility: CLINIC | Age: 63
Setting detail: OPHTHALMOLOGY
End: 2023-08-10
Payer: COMMERCIAL

## 2023-08-10 DIAGNOSIS — H35.3221: ICD-10-CM

## 2023-08-10 PROCEDURE — 67028 INJECTION EYE DRUG: CPT | Performed by: OPHTHALMOLOGY

## 2023-08-10 ASSESSMENT — SPHEQUIV_DERIVED
OD_SPHEQUIV: -0.875
OS_SPHEQUIV: -1.125

## 2023-08-10 ASSESSMENT — REFRACTION_AUTOREFRACTION
OS_AXIS: 055
OD_CYLINDER: -1.75
OS_SPHERE: -0.50
OD_AXIS: 109
OD_SPHERE: 0.00
OS_CYLINDER: -1.25

## 2023-08-10 ASSESSMENT — VISUAL ACUITY
OS_BCVA: 20/20-1
OD_BCVA: 20/60

## 2023-08-24 ENCOUNTER — DOCTOR'S OFFICE (OUTPATIENT)
Dept: URBAN - NONMETROPOLITAN AREA CLINIC 1 | Facility: CLINIC | Age: 63
Setting detail: OPHTHALMOLOGY
End: 2023-08-24
Payer: COMMERCIAL

## 2023-08-24 DIAGNOSIS — H35.81: ICD-10-CM

## 2023-08-24 DIAGNOSIS — H04.122: ICD-10-CM

## 2023-08-24 DIAGNOSIS — H04.123: ICD-10-CM

## 2023-08-24 DIAGNOSIS — H35.3221: ICD-10-CM

## 2023-08-24 PROBLEM — H04.121 DRY EYE; RIGHT EYE, LEFT EYE: Status: ACTIVE | Noted: 2023-08-24

## 2023-08-24 PROCEDURE — 92134 CPTRZ OPH DX IMG PST SGM RTA: CPT | Performed by: OPHTHALMOLOGY

## 2023-08-24 PROCEDURE — 83861 MICROFLUID ANALY TEARS: CPT | Performed by: OPHTHALMOLOGY

## 2023-08-24 PROCEDURE — 99213 OFFICE O/P EST LOW 20 MIN: CPT | Performed by: OPHTHALMOLOGY

## 2023-08-24 PROCEDURE — DEFER/DELA DEFER/DELAY: Performed by: OPHTHALMOLOGY

## 2023-08-24 ASSESSMENT — CONFRONTATIONAL VISUAL FIELD TEST (CVF)
OS_FINDINGS: FULL
OD_FINDINGS: FULL

## 2023-08-24 ASSESSMENT — LID EXAM ASSESSMENTS
OD_BLEPHARITIS: RUL T 1+
OS_BLEPHARITIS: LUL T 1+

## 2023-08-24 ASSESSMENT — TEAR BREAK UP TIME (TBUT): OD_TBUT: 6-7 SEC

## 2023-08-24 ASSESSMENT — SUPERFICIAL PUNCTATE KERATITIS (SPK)
OD_SPK: T
OS_SPK: T 1+

## 2023-08-30 ASSESSMENT — REFRACTION_AUTOREFRACTION
OD_CYLINDER: -1.75
OS_AXIS: 055
OS_SPHERE: -0.50
OD_SPHERE: 0.00
OS_CYLINDER: -1.25
OD_AXIS: 109

## 2023-08-30 ASSESSMENT — SPHEQUIV_DERIVED
OS_SPHEQUIV: -1.125
OD_SPHEQUIV: -0.875

## 2023-08-30 ASSESSMENT — VISUAL ACUITY
OS_BCVA: 20/20-1
OD_BCVA: 20/50

## 2023-09-21 ENCOUNTER — DOCTOR'S OFFICE (OUTPATIENT)
Dept: URBAN - NONMETROPOLITAN AREA CLINIC 1 | Facility: CLINIC | Age: 63
Setting detail: OPHTHALMOLOGY
End: 2023-09-21
Payer: COMMERCIAL

## 2023-09-21 DIAGNOSIS — H35.81: ICD-10-CM

## 2023-09-21 DIAGNOSIS — H35.3221: ICD-10-CM

## 2023-09-21 PROCEDURE — 67028 INJECTION EYE DRUG: CPT | Performed by: OPHTHALMOLOGY

## 2023-09-21 PROCEDURE — 92250 FUNDUS PHOTOGRAPHY W/I&R: CPT | Performed by: OPHTHALMOLOGY

## 2023-09-21 PROCEDURE — 92235 FLUORESCEIN ANGRPH MLTIFRAME: CPT | Performed by: OPHTHALMOLOGY

## 2023-09-21 ASSESSMENT — REFRACTION_AUTOREFRACTION
OD_AXIS: 109
OS_CYLINDER: -1.25
OD_SPHERE: 0.00
OS_AXIS: 055
OD_CYLINDER: -1.75
OS_SPHERE: -0.50

## 2023-09-21 ASSESSMENT — VISUAL ACUITY
OS_BCVA: 20/20-2
OD_BCVA: 20/60-1

## 2023-09-21 ASSESSMENT — SPHEQUIV_DERIVED
OD_SPHEQUIV: -0.875
OS_SPHEQUIV: -1.125

## 2023-09-21 ASSESSMENT — CONFRONTATIONAL VISUAL FIELD TEST (CVF)
OS_FINDINGS: FULL
OD_FINDINGS: FULL

## 2023-09-25 ENCOUNTER — DOCTOR'S OFFICE (OUTPATIENT)
Dept: URBAN - NONMETROPOLITAN AREA CLINIC 1 | Facility: CLINIC | Age: 63
Setting detail: OPHTHALMOLOGY
End: 2023-09-25
Payer: COMMERCIAL

## 2023-09-25 DIAGNOSIS — H35.3221: ICD-10-CM

## 2023-09-25 DIAGNOSIS — H04.123: ICD-10-CM

## 2023-09-25 DIAGNOSIS — H35.81: ICD-10-CM

## 2023-09-25 DIAGNOSIS — H40.012: ICD-10-CM

## 2023-09-25 PROCEDURE — 92134 CPTRZ OPH DX IMG PST SGM RTA: CPT | Performed by: OPHTHALMOLOGY

## 2023-09-25 PROCEDURE — 99213 OFFICE O/P EST LOW 20 MIN: CPT | Performed by: OPHTHALMOLOGY

## 2023-09-25 ASSESSMENT — LID EXAM ASSESSMENTS
OS_BLEPHARITIS: LUL T 1+
OD_BLEPHARITIS: RUL T 1+

## 2023-09-25 ASSESSMENT — REFRACTION_AUTOREFRACTION
OD_AXIS: 109
OD_SPHERE: 0.00
OS_CYLINDER: -1.25
OS_AXIS: 055
OD_CYLINDER: -1.75
OS_SPHERE: -0.50

## 2023-09-25 ASSESSMENT — CONFRONTATIONAL VISUAL FIELD TEST (CVF)
OD_FINDINGS: FULL
OS_FINDINGS: FULL

## 2023-09-25 ASSESSMENT — SUPERFICIAL PUNCTATE KERATITIS (SPK)
OS_SPK: T 1+
OD_SPK: T

## 2023-09-25 ASSESSMENT — VISUAL ACUITY
OS_BCVA: 20/20-1
OD_BCVA: 20/40

## 2023-09-25 ASSESSMENT — SPHEQUIV_DERIVED
OS_SPHEQUIV: -1.125
OD_SPHEQUIV: -0.875

## 2023-09-25 ASSESSMENT — TEAR BREAK UP TIME (TBUT): OD_TBUT: 6-7 SEC

## 2023-10-23 ENCOUNTER — DOCTOR'S OFFICE (OUTPATIENT)
Dept: URBAN - NONMETROPOLITAN AREA CLINIC 1 | Facility: CLINIC | Age: 63
Setting detail: OPHTHALMOLOGY
End: 2023-10-23
Payer: COMMERCIAL

## 2023-10-23 DIAGNOSIS — H35.3221: ICD-10-CM

## 2023-10-23 PROCEDURE — 67028 INJECTION EYE DRUG: CPT | Mod: LT | Performed by: OPHTHALMOLOGY

## 2023-10-23 PROCEDURE — DEFER/DELA DEFER/DELAY: Performed by: OPHTHALMOLOGY

## 2023-10-23 ASSESSMENT — REFRACTION_AUTOREFRACTION
OS_AXIS: 055
OS_CYLINDER: -1.25
OS_SPHERE: -0.50
OD_SPHERE: 0.00
OD_AXIS: 109
OD_CYLINDER: -1.75

## 2023-10-23 ASSESSMENT — VISUAL ACUITY
OD_BCVA: 20/60-2
OS_BCVA: 20/20-1

## 2023-10-23 ASSESSMENT — SPHEQUIV_DERIVED
OD_SPHEQUIV: -0.875
OS_SPHEQUIV: -1.125

## 2023-11-10 ENCOUNTER — DOCTOR'S OFFICE (OUTPATIENT)
Dept: URBAN - NONMETROPOLITAN AREA CLINIC 1 | Facility: CLINIC | Age: 63
Setting detail: OPHTHALMOLOGY
End: 2023-11-10
Payer: COMMERCIAL

## 2023-11-10 DIAGNOSIS — H04.123: ICD-10-CM

## 2023-11-10 DIAGNOSIS — H35.3221: ICD-10-CM

## 2023-11-10 DIAGNOSIS — H40.012: ICD-10-CM

## 2023-11-10 DIAGNOSIS — H35.81: ICD-10-CM

## 2023-11-10 PROCEDURE — 99214 OFFICE O/P EST MOD 30 MIN: CPT | Performed by: OPHTHALMOLOGY

## 2023-11-10 PROCEDURE — 92133 CPTRZD OPH DX IMG PST SGM ON: CPT | Performed by: OPHTHALMOLOGY

## 2023-11-10 ASSESSMENT — TEAR BREAK UP TIME (TBUT): OD_TBUT: 6-7 SEC

## 2023-11-10 ASSESSMENT — REFRACTION_AUTOREFRACTION
OD_CYLINDER: -2.00
OS_SPHERE: -1.00
OD_SPHERE: 0.00
OS_AXIS: 44
OD_AXIS: 111
OS_CYLINDER: -0.75

## 2023-11-10 ASSESSMENT — PACHYMETRY
OS_CT_UM: 600
OD_CT_UM: 593
OS_CT_CORRECTION: -4
OD_CT_CORRECTION: -4

## 2023-11-10 ASSESSMENT — CONFRONTATIONAL VISUAL FIELD TEST (CVF)
OD_FINDINGS: FULL
OS_FINDINGS: FULL

## 2023-11-10 ASSESSMENT — LID EXAM ASSESSMENTS
OS_BLEPHARITIS: LUL T 1+
OD_BLEPHARITIS: RUL T 1+

## 2023-11-10 ASSESSMENT — TONOMETRY
OS_IOP_MMHG: 17
OD_IOP_MMHG: 14

## 2023-11-10 ASSESSMENT — VISUAL ACUITY
OD_BCVA: 20/50-2
OS_BCVA: 20/20-1

## 2023-11-10 ASSESSMENT — SPHEQUIV_DERIVED
OS_SPHEQUIV: -1.375
OD_SPHEQUIV: -1

## 2023-11-10 ASSESSMENT — SUPERFICIAL PUNCTATE KERATITIS (SPK)
OS_SPK: T 1+
OD_SPK: T

## 2023-11-27 ENCOUNTER — DOCTOR'S OFFICE (OUTPATIENT)
Dept: URBAN - NONMETROPOLITAN AREA CLINIC 1 | Facility: CLINIC | Age: 63
Setting detail: OPHTHALMOLOGY
End: 2023-11-27
Payer: COMMERCIAL

## 2023-11-27 DIAGNOSIS — H40.012: ICD-10-CM

## 2023-11-27 DIAGNOSIS — H35.81: ICD-10-CM

## 2023-11-27 DIAGNOSIS — H35.3221: ICD-10-CM

## 2023-11-27 PROCEDURE — 99214 OFFICE O/P EST MOD 30 MIN: CPT | Performed by: OPHTHALMOLOGY

## 2023-11-27 PROCEDURE — 92134 CPTRZ OPH DX IMG PST SGM RTA: CPT | Performed by: OPHTHALMOLOGY

## 2023-11-27 ASSESSMENT — REFRACTION_AUTOREFRACTION
OS_AXIS: 44
OS_CYLINDER: -0.75
OD_CYLINDER: -2.00
OD_SPHERE: 0.00
OD_AXIS: 111
OS_SPHERE: -1.00

## 2023-11-27 ASSESSMENT — SUPERFICIAL PUNCTATE KERATITIS (SPK)
OS_SPK: T 1+
OD_SPK: T

## 2023-11-27 ASSESSMENT — CONFRONTATIONAL VISUAL FIELD TEST (CVF)
OS_FINDINGS: FULL
OD_FINDINGS: FULL

## 2023-11-27 ASSESSMENT — SPHEQUIV_DERIVED
OS_SPHEQUIV: -1.375
OD_SPHEQUIV: -1

## 2023-11-27 ASSESSMENT — VISUAL ACUITY
OD_BCVA: 20/40
OS_BCVA: 20/20-1

## 2023-11-27 ASSESSMENT — TEAR BREAK UP TIME (TBUT): OD_TBUT: 6-7 SEC

## 2023-11-27 ASSESSMENT — LID EXAM ASSESSMENTS
OD_BLEPHARITIS: RUL T 1+
OS_BLEPHARITIS: LUL T 1+

## 2023-12-04 ENCOUNTER — DOCTOR'S OFFICE (OUTPATIENT)
Dept: URBAN - NONMETROPOLITAN AREA CLINIC 1 | Facility: CLINIC | Age: 63
Setting detail: OPHTHALMOLOGY
End: 2023-12-04
Payer: COMMERCIAL

## 2023-12-04 DIAGNOSIS — H35.3221: ICD-10-CM

## 2023-12-04 PROCEDURE — 67028 INJECTION EYE DRUG: CPT | Mod: LT | Performed by: OPHTHALMOLOGY

## 2023-12-04 ASSESSMENT — REFRACTION_AUTOREFRACTION
OD_SPHERE: 0.00
OD_CYLINDER: -2.00
OS_CYLINDER: -0.75
OS_SPHERE: -1.00
OS_AXIS: 44
OD_AXIS: 111

## 2023-12-04 ASSESSMENT — SPHEQUIV_DERIVED
OD_SPHEQUIV: -1
OS_SPHEQUIV: -1.375

## 2023-12-18 ENCOUNTER — DOCTOR'S OFFICE (OUTPATIENT)
Dept: URBAN - NONMETROPOLITAN AREA CLINIC 1 | Facility: CLINIC | Age: 63
Setting detail: OPHTHALMOLOGY
End: 2023-12-18
Payer: COMMERCIAL

## 2023-12-18 DIAGNOSIS — H35.3221: ICD-10-CM

## 2023-12-18 DIAGNOSIS — H40.012: ICD-10-CM

## 2023-12-18 DIAGNOSIS — H35.81: ICD-10-CM

## 2023-12-18 PROCEDURE — 99213 OFFICE O/P EST LOW 20 MIN: CPT | Performed by: OPHTHALMOLOGY

## 2023-12-18 PROCEDURE — 92240 ICG ANGIOGRAPHY I&R UNI/BI: CPT | Performed by: OPHTHALMOLOGY

## 2023-12-18 ASSESSMENT — CONFRONTATIONAL VISUAL FIELD TEST (CVF)
OD_FINDINGS: FULL
OS_FINDINGS: FULL

## 2023-12-18 ASSESSMENT — LID EXAM ASSESSMENTS
OS_BLEPHARITIS: LUL T 1+
OD_BLEPHARITIS: RUL T 1+

## 2023-12-18 ASSESSMENT — SUPERFICIAL PUNCTATE KERATITIS (SPK)
OS_SPK: T 1+
OD_SPK: T

## 2023-12-18 ASSESSMENT — REFRACTION_AUTOREFRACTION
OD_CYLINDER: -2.00
OD_AXIS: 111
OS_AXIS: 44
OS_CYLINDER: -0.75
OD_SPHERE: 0.00
OS_SPHERE: -1.00

## 2023-12-18 ASSESSMENT — SPHEQUIV_DERIVED
OS_SPHEQUIV: -1.375
OD_SPHEQUIV: -1

## 2023-12-18 ASSESSMENT — TEAR BREAK UP TIME (TBUT): OD_TBUT: 6-7 SEC

## 2024-10-01 ENCOUNTER — DOCTOR'S OFFICE (OUTPATIENT)
Dept: URBAN - NONMETROPOLITAN AREA CLINIC 1 | Facility: CLINIC | Age: 64
Setting detail: OPHTHALMOLOGY
End: 2024-10-01

## 2024-10-01 ENCOUNTER — RX ONLY (RX ONLY)
Age: 64
End: 2024-10-01

## 2024-10-01 ENCOUNTER — OPTICAL OFFICE (OUTPATIENT)
Dept: URBAN - NONMETROPOLITAN AREA CLINIC 4 | Facility: CLINIC | Age: 64
Setting detail: OPHTHALMOLOGY
End: 2024-10-01

## 2024-10-01 DIAGNOSIS — H52.223: ICD-10-CM

## 2024-10-01 DIAGNOSIS — H52.4: ICD-10-CM

## 2024-10-01 PROCEDURE — DEFER/DELA DEFER/DELAY: Performed by: OPTOMETRIST

## 2024-10-01 PROCEDURE — V2784 LENS POLYCARB OR EQUAL: HCPCS | Mod: LT | Performed by: OPTOMETRIST

## 2024-10-01 PROCEDURE — V2781 PROGRESSIVE LENS PER LENS: HCPCS | Mod: LT | Performed by: OPTOMETRIST

## 2024-10-01 PROCEDURE — V2750 ANTI-REFLECTIVE COATING: HCPCS | Performed by: OPTOMETRIST

## 2024-10-01 PROCEDURE — V2781 PROGRESSIVE LENS PER LENS: HCPCS | Performed by: OPTOMETRIST

## 2024-10-01 PROCEDURE — V2784 LENS POLYCARB OR EQUAL: HCPCS | Performed by: OPTOMETRIST

## 2024-10-01 PROCEDURE — V2020 VISION SVCS FRAMES PURCHASES: HCPCS | Performed by: OPTOMETRIST

## 2024-10-01 PROCEDURE — 92015 DETERMINE REFRACTIVE STATE: CPT | Performed by: OPTOMETRIST

## 2024-10-01 PROCEDURE — V2750 ANTI-REFLECTIVE COATING: HCPCS | Mod: LT | Performed by: OPTOMETRIST

## 2024-10-01 ASSESSMENT — REFRACTION_CURRENTRX
OD_AXIS: 108
OD_CYLINDER: -1.25
OD_VPRISM_DIRECTION: BF
OD_OVR_VA: 20/
OD_SPHERE: -0.25
OD_ADD: +2.25
OS_VPRISM_DIRECTION: BF
OS_OVR_VA: 20/
OS_AXIS: 085
OS_SPHERE: PLANO
OS_CYLINDER: -0.50
OS_ADD: +2.25

## 2024-10-01 ASSESSMENT — REFRACTION_MANIFEST
OD_ADD: +2.50
OD_SPHERE: PLANO
OS_AXIS: 060
OD_VA2: 20/20-2
OS_ADD: +2.50
OS_VA1: 20/100
OS_CYLINDER: -0.50
OD_CYLINDER: -1.75
OS_SPHERE: -0.75
OD_VA1: 20/20-2
OS_VA2: 20/100
OD_AXIS: 110

## 2024-10-01 ASSESSMENT — REFRACTION_AUTOREFRACTION
OD_SPHERE: 0.00
OS_SPHERE: -1.00
OD_AXIS: 111
OS_CYLINDER: -0.75
OS_AXIS: 44
OD_CYLINDER: -2.00

## 2024-10-01 ASSESSMENT — LID EXAM ASSESSMENTS
OS_BLEPHARITIS: LUL T 1+
OD_BLEPHARITIS: RUL T 1+

## 2024-10-01 ASSESSMENT — CONFRONTATIONAL VISUAL FIELD TEST (CVF)
OS_FINDINGS: FULL
OD_FINDINGS: FULL

## 2024-10-01 ASSESSMENT — TEAR BREAK UP TIME (TBUT): OD_TBUT: 6-7 SEC

## 2024-10-01 ASSESSMENT — SUPERFICIAL PUNCTATE KERATITIS (SPK)
OS_SPK: T 1+
OD_SPK: T

## 2024-10-01 ASSESSMENT — VISUAL ACUITY
OD_BCVA: 20/150+1
OS_BCVA: 20/20

## 2025-01-16 ENCOUNTER — HOSPITAL ENCOUNTER (EMERGENCY)
Facility: HOSPITAL | Age: 65
End: 2025-01-16
Attending: EMERGENCY MEDICINE

## 2025-01-16 ENCOUNTER — HOSPITAL ENCOUNTER (INPATIENT)
Facility: HOSPITAL | Age: 65
LOS: 6 days | Discharge: HOME | End: 2025-01-22
Attending: STUDENT IN AN ORGANIZED HEALTH CARE EDUCATION/TRAINING PROGRAM | Admitting: PSYCHIATRY & NEUROLOGY
Payer: COMMERCIAL

## 2025-01-16 VITALS
RESPIRATION RATE: 18 BRPM | SYSTOLIC BLOOD PRESSURE: 174 MMHG | HEART RATE: 79 BPM | DIASTOLIC BLOOD PRESSURE: 88 MMHG | OXYGEN SATURATION: 96 % | TEMPERATURE: 98 F

## 2025-01-16 DIAGNOSIS — F22 DELUSIONAL THOUGHTS (HCC): Primary | ICD-10-CM

## 2025-01-16 DIAGNOSIS — E55.9 VITAMIN D DEFICIENCY: ICD-10-CM

## 2025-01-16 DIAGNOSIS — F22 DELUSIONAL THOUGHTS (HCC): ICD-10-CM

## 2025-01-16 DIAGNOSIS — R35.0 URINARY FREQUENCY: ICD-10-CM

## 2025-01-16 DIAGNOSIS — Z00.8 ENCOUNTER FOR PSYCHOLOGICAL EVALUATION: ICD-10-CM

## 2025-01-16 DIAGNOSIS — R17 SERUM TOTAL BILIRUBIN ELEVATED: ICD-10-CM

## 2025-01-16 DIAGNOSIS — F39 UNSPECIFIED MOOD (AFFECTIVE) DISORDER (HCC): Primary | ICD-10-CM

## 2025-01-16 DIAGNOSIS — E53.8 VITAMIN B12 DEFICIENCY: ICD-10-CM

## 2025-01-16 LAB
ALBUMIN SERPL BCG-MCNC: 4.5 G/DL (ref 3.5–5)
ALP SERPL-CCNC: 75 U/L (ref 34–104)
ALT SERPL W P-5'-P-CCNC: 22 U/L (ref 7–52)
AMPHETAMINES SERPL QL SCN: NEGATIVE
ANION GAP SERPL CALCULATED.3IONS-SCNC: 8 MMOL/L (ref 4–13)
AST SERPL W P-5'-P-CCNC: 22 U/L (ref 13–39)
BARBITURATES UR QL: NEGATIVE
BASOPHILS # BLD AUTO: 0.02 THOUSANDS/ΜL (ref 0–0.1)
BASOPHILS NFR BLD AUTO: 0 % (ref 0–1)
BENZODIAZ UR QL: NEGATIVE
BILIRUB SERPL-MCNC: 1.13 MG/DL (ref 0.2–1)
BILIRUB UR QL STRIP: NEGATIVE
BUN SERPL-MCNC: 10 MG/DL (ref 5–25)
CALCIUM SERPL-MCNC: 9.8 MG/DL (ref 8.4–10.2)
CHLORIDE SERPL-SCNC: 104 MMOL/L (ref 96–108)
CLARITY UR: CLEAR
CO2 SERPL-SCNC: 29 MMOL/L (ref 21–32)
COCAINE UR QL: NEGATIVE
COLOR UR: YELLOW
CREAT SERPL-MCNC: 0.85 MG/DL (ref 0.6–1.3)
EOSINOPHIL # BLD AUTO: 0.03 THOUSAND/ΜL (ref 0–0.61)
EOSINOPHIL NFR BLD AUTO: 1 % (ref 0–6)
ERYTHROCYTE [DISTWIDTH] IN BLOOD BY AUTOMATED COUNT: 13.5 % (ref 11.6–15.1)
ETHANOL SERPL-MCNC: <10 MG/DL
FENTANYL UR QL SCN: NEGATIVE
GFR SERPL CREATININE-BSD FRML MDRD: 73 ML/MIN/1.73SQ M
GLUCOSE SERPL-MCNC: 107 MG/DL (ref 65–140)
GLUCOSE UR STRIP-MCNC: NEGATIVE MG/DL
HCT VFR BLD AUTO: 43.4 % (ref 34.8–46.1)
HGB BLD-MCNC: 14.6 G/DL (ref 11.5–15.4)
HGB UR QL STRIP.AUTO: NEGATIVE
HYDROCODONE UR QL SCN: NEGATIVE
IMM GRANULOCYTES # BLD AUTO: 0.02 THOUSAND/UL (ref 0–0.2)
IMM GRANULOCYTES NFR BLD AUTO: 0 % (ref 0–2)
KETONES UR STRIP-MCNC: NEGATIVE MG/DL
LEUKOCYTE ESTERASE UR QL STRIP: NEGATIVE
LYMPHOCYTES # BLD AUTO: 1.23 THOUSANDS/ΜL (ref 0.6–4.47)
LYMPHOCYTES NFR BLD AUTO: 25 % (ref 14–44)
MCH RBC QN AUTO: 29.1 PG (ref 26.8–34.3)
MCHC RBC AUTO-ENTMCNC: 33.6 G/DL (ref 31.4–37.4)
MCV RBC AUTO: 87 FL (ref 82–98)
METHADONE UR QL: NEGATIVE
MONOCYTES # BLD AUTO: 0.41 THOUSAND/ΜL (ref 0.17–1.22)
MONOCYTES NFR BLD AUTO: 8 % (ref 4–12)
NEUTROPHILS # BLD AUTO: 3.28 THOUSANDS/ΜL (ref 1.85–7.62)
NEUTS SEG NFR BLD AUTO: 66 % (ref 43–75)
NITRITE UR QL STRIP: NEGATIVE
NRBC BLD AUTO-RTO: 0 /100 WBCS
OPIATES UR QL SCN: NEGATIVE
OXYCODONE+OXYMORPHONE UR QL SCN: NEGATIVE
PCP UR QL: NEGATIVE
PH UR STRIP.AUTO: 6 [PH]
PLATELET # BLD AUTO: 204 THOUSANDS/UL (ref 149–390)
PMV BLD AUTO: 9.7 FL (ref 8.9–12.7)
POTASSIUM SERPL-SCNC: 3.7 MMOL/L (ref 3.5–5.3)
PROT SERPL-MCNC: 8.1 G/DL (ref 6.4–8.4)
PROT UR STRIP-MCNC: NEGATIVE MG/DL
RBC # BLD AUTO: 5.01 MILLION/UL (ref 3.81–5.12)
SODIUM SERPL-SCNC: 141 MMOL/L (ref 135–147)
SP GR UR STRIP.AUTO: 1.01 (ref 1–1.03)
THC UR QL: NEGATIVE
TSH SERPL DL<=0.05 MIU/L-ACNC: 1.96 UIU/ML (ref 0.45–4.5)
UROBILINOGEN UR QL STRIP.AUTO: 0.2 E.U./DL
WBC # BLD AUTO: 4.99 THOUSAND/UL (ref 4.31–10.16)

## 2025-01-16 PROCEDURE — 93005 ELECTROCARDIOGRAM TRACING: CPT

## 2025-01-16 PROCEDURE — 36415 COLL VENOUS BLD VENIPUNCTURE: CPT | Performed by: EMERGENCY MEDICINE

## 2025-01-16 PROCEDURE — 99285 EMERGENCY DEPT VISIT HI MDM: CPT | Performed by: EMERGENCY MEDICINE

## 2025-01-16 PROCEDURE — 84443 ASSAY THYROID STIM HORMONE: CPT | Performed by: EMERGENCY MEDICINE

## 2025-01-16 PROCEDURE — 80053 COMPREHEN METABOLIC PANEL: CPT | Performed by: EMERGENCY MEDICINE

## 2025-01-16 PROCEDURE — 99283 EMERGENCY DEPT VISIT LOW MDM: CPT

## 2025-01-16 PROCEDURE — 81003 URINALYSIS AUTO W/O SCOPE: CPT | Performed by: EMERGENCY MEDICINE

## 2025-01-16 PROCEDURE — 80307 DRUG TEST PRSMV CHEM ANLYZR: CPT | Performed by: EMERGENCY MEDICINE

## 2025-01-16 PROCEDURE — 85025 COMPLETE CBC W/AUTO DIFF WBC: CPT | Performed by: EMERGENCY MEDICINE

## 2025-01-16 PROCEDURE — 82077 ASSAY SPEC XCP UR&BREATH IA: CPT | Performed by: EMERGENCY MEDICINE

## 2025-01-16 RX ORDER — MAGNESIUM HYDROXIDE/ALUMINUM HYDROXICE/SIMETHICONE 120; 1200; 1200 MG/30ML; MG/30ML; MG/30ML
30 SUSPENSION ORAL EVERY 4 HOURS PRN
Status: DISCONTINUED | OUTPATIENT
Start: 2025-01-16 | End: 2025-01-22 | Stop reason: HOSPADM

## 2025-01-16 RX ORDER — ACETAMINOPHEN 325 MG/1
650 TABLET ORAL EVERY 4 HOURS PRN
Status: DISCONTINUED | OUTPATIENT
Start: 2025-01-16 | End: 2025-01-22 | Stop reason: HOSPADM

## 2025-01-16 RX ORDER — RISPERIDONE 1 MG/1
0.5 TABLET ORAL
Status: CANCELLED | OUTPATIENT
Start: 2025-01-16

## 2025-01-16 RX ORDER — HALOPERIDOL 5 MG/ML
5 INJECTION INTRAMUSCULAR
Status: DISCONTINUED | OUTPATIENT
Start: 2025-01-16 | End: 2025-01-22 | Stop reason: HOSPADM

## 2025-01-16 RX ORDER — HYDROXYZINE HYDROCHLORIDE 25 MG/1
25 TABLET, FILM COATED ORAL
Status: CANCELLED | OUTPATIENT
Start: 2025-01-16

## 2025-01-16 RX ORDER — RISPERIDONE 0.5 MG/1
0.5 TABLET ORAL
Status: DISCONTINUED | OUTPATIENT
Start: 2025-01-16 | End: 2025-01-22 | Stop reason: HOSPADM

## 2025-01-16 RX ORDER — TRAZODONE HYDROCHLORIDE 50 MG/1
50 TABLET, FILM COATED ORAL
Status: CANCELLED | OUTPATIENT
Start: 2025-01-16

## 2025-01-16 RX ORDER — RISPERIDONE 1 MG/1
1 TABLET ORAL
Status: CANCELLED | OUTPATIENT
Start: 2025-01-16

## 2025-01-16 RX ORDER — RISPERIDONE 0.25 MG/1
0.25 TABLET ORAL
Status: DISCONTINUED | OUTPATIENT
Start: 2025-01-16 | End: 2025-01-22 | Stop reason: HOSPADM

## 2025-01-16 RX ORDER — ACETAMINOPHEN 325 MG/1
975 TABLET ORAL EVERY 6 HOURS PRN
Status: DISCONTINUED | OUTPATIENT
Start: 2025-01-16 | End: 2025-01-22 | Stop reason: HOSPADM

## 2025-01-16 RX ORDER — HYDROXYZINE HYDROCHLORIDE 25 MG/1
50 TABLET, FILM COATED ORAL
Status: CANCELLED | OUTPATIENT
Start: 2025-01-16

## 2025-01-16 RX ORDER — ACETAMINOPHEN 325 MG/1
650 TABLET ORAL EVERY 4 HOURS PRN
Status: CANCELLED | OUTPATIENT
Start: 2025-01-16

## 2025-01-16 RX ORDER — PROPRANOLOL HYDROCHLORIDE 10 MG/1
5 TABLET ORAL EVERY 8 HOURS PRN
Status: DISCONTINUED | OUTPATIENT
Start: 2025-01-16 | End: 2025-01-22 | Stop reason: HOSPADM

## 2025-01-16 RX ORDER — RISPERIDONE 0.25 MG/1
0.25 TABLET ORAL
Status: CANCELLED | OUTPATIENT
Start: 2025-01-16

## 2025-01-16 RX ORDER — RISPERIDONE 1 MG/1
1 TABLET ORAL
Status: DISCONTINUED | OUTPATIENT
Start: 2025-01-16 | End: 2025-01-22 | Stop reason: HOSPADM

## 2025-01-16 RX ORDER — TRAZODONE HYDROCHLORIDE 50 MG/1
50 TABLET, FILM COATED ORAL
Status: DISCONTINUED | OUTPATIENT
Start: 2025-01-16 | End: 2025-01-22 | Stop reason: HOSPADM

## 2025-01-16 RX ORDER — ESCITALOPRAM OXALATE 10 MG/1
20 TABLET ORAL DAILY
Status: DISCONTINUED | OUTPATIENT
Start: 2025-01-16 | End: 2025-01-16 | Stop reason: HOSPADM

## 2025-01-16 RX ORDER — MAGNESIUM HYDROXIDE/ALUMINUM HYDROXICE/SIMETHICONE 120; 1200; 1200 MG/30ML; MG/30ML; MG/30ML
30 SUSPENSION ORAL EVERY 4 HOURS PRN
Status: CANCELLED | OUTPATIENT
Start: 2025-01-16

## 2025-01-16 RX ORDER — HYDROXYZINE HYDROCHLORIDE 50 MG/1
50 TABLET, FILM COATED ORAL
Status: DISCONTINUED | OUTPATIENT
Start: 2025-01-16 | End: 2025-01-22 | Stop reason: HOSPADM

## 2025-01-16 RX ORDER — PROPRANOLOL HYDROCHLORIDE 10 MG/1
5 TABLET ORAL EVERY 8 HOURS PRN
Status: CANCELLED | OUTPATIENT
Start: 2025-01-16

## 2025-01-16 RX ORDER — HALOPERIDOL 5 MG/ML
5 INJECTION INTRAMUSCULAR
Status: CANCELLED | OUTPATIENT
Start: 2025-01-16

## 2025-01-16 RX ORDER — HYDROXYZINE HYDROCHLORIDE 25 MG/1
25 TABLET, FILM COATED ORAL
Status: DISCONTINUED | OUTPATIENT
Start: 2025-01-16 | End: 2025-01-22 | Stop reason: HOSPADM

## 2025-01-16 RX ORDER — IBUPROFEN 400 MG/1
400 TABLET, FILM COATED ORAL ONCE
Status: COMPLETED | OUTPATIENT
Start: 2025-01-16 | End: 2025-01-16

## 2025-01-16 RX ORDER — ACETAMINOPHEN 325 MG/1
975 TABLET ORAL EVERY 6 HOURS PRN
Status: CANCELLED | OUTPATIENT
Start: 2025-01-16

## 2025-01-16 RX ADMIN — IBUPROFEN 400 MG: 400 TABLET, FILM COATED ORAL at 16:01

## 2025-01-16 RX ADMIN — ESCITALOPRAM OXALATE 20 MG: 10 TABLET ORAL at 13:02

## 2025-01-16 NOTE — ED NOTES
Provider spoke with patient and provided update on patient. [facility] Crisis worker in triage to read pt's rights for petitioned 302       Betsy Garnica RN  01/16/25 5814
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
## 2025-01-16 NOTE — ED NOTES
201 Completed by provider and emailed to [facility] crisis worker. Provider aware of need to complete page 7 of 302.        Betsy Garnica RN  01/16/25 8912

## 2025-01-16 NOTE — ED NOTES
Patient is accepted at [facility], [facility].  Patient is accepted by Dr. Dinora Vivas.     Transportation is arranged with [employer].     Transportation is scheduled for 20:30.   Patient may go to the floor at 20:30.          Nurse report is to be called to [phone] prior to patient transfer.

## 2025-01-16 NOTE — ED NOTES
Patient presented to the ED on a 302 petitioned by the daughter, as per county they stated the following. Patient was having delusional thoughts under a pretense of a scam, patient believed taht she was dating the goo goo dolls,Jt Woodruff.  She was sending him money and gift cards, birthday cards to his daughter and took off and went to [place] to see him.  The patient appears very anxious about what has happened.  302 was petitioned by son and daughter which was brought forth by ED crisis. Patient willing to sign 201, she understands the strong family history of mental health disorders and depression and herself and would like to seek treatment inpatient, admit 201 consent signed.  Patient appears severely depressed.  Patient denies suicidal ideations or suicidal plan.  Denies homicidal ideations.

## 2025-01-17 PROBLEM — R17 SERUM TOTAL BILIRUBIN ELEVATED: Status: ACTIVE | Noted: 2025-01-17

## 2025-01-17 PROBLEM — F39 UNSPECIFIED MOOD (AFFECTIVE) DISORDER (HCC): Status: ACTIVE | Noted: 2025-01-17

## 2025-01-17 PROBLEM — E55.9 VITAMIN D DEFICIENCY: Status: ACTIVE | Noted: 2025-01-17

## 2025-01-17 PROBLEM — E53.8 VITAMIN B12 DEFICIENCY: Status: ACTIVE | Noted: 2025-01-17

## 2025-01-17 PROBLEM — C02.9 SQUAMOUS CELL CANCER OF TONGUE (HCC): Status: ACTIVE | Noted: 2025-01-17

## 2025-01-17 PROBLEM — R35.0 URINARY FREQUENCY: Status: ACTIVE | Noted: 2025-01-17

## 2025-01-17 LAB
25(OH)D3 SERPL-MCNC: 21.3 NG/ML (ref 30–100)
ALBUMIN SERPL BCG-MCNC: 3.9 G/DL (ref 3.5–5)
ALP SERPL-CCNC: 64 U/L (ref 34–104)
ALT SERPL W P-5'-P-CCNC: 19 U/L (ref 7–52)
ANION GAP SERPL CALCULATED.3IONS-SCNC: 9 MMOL/L (ref 4–13)
AST SERPL W P-5'-P-CCNC: 25 U/L (ref 13–39)
ATRIAL RATE: 70 BPM
BASOPHILS # BLD AUTO: 0.04 THOUSANDS/ΜL (ref 0–0.1)
BASOPHILS NFR BLD AUTO: 1 % (ref 0–1)
BILIRUB SERPL-MCNC: 1.05 MG/DL (ref 0.2–1)
BUN SERPL-MCNC: 9 MG/DL (ref 5–25)
CALCIUM SERPL-MCNC: 9.1 MG/DL (ref 8.4–10.2)
CHLORIDE SERPL-SCNC: 105 MMOL/L (ref 96–108)
CHOLEST SERPL-MCNC: 170 MG/DL (ref ?–200)
CO2 SERPL-SCNC: 27 MMOL/L (ref 21–32)
CREAT SERPL-MCNC: 0.89 MG/DL (ref 0.6–1.3)
EOSINOPHIL # BLD AUTO: 0.13 THOUSAND/ΜL (ref 0–0.61)
EOSINOPHIL NFR BLD AUTO: 3 % (ref 0–6)
ERYTHROCYTE [DISTWIDTH] IN BLOOD BY AUTOMATED COUNT: 13.3 % (ref 11.6–15.1)
EST. AVERAGE GLUCOSE BLD GHB EST-MCNC: 117 MG/DL
FOLATE SERPL-MCNC: >22.3 NG/ML
GFR SERPL CREATININE-BSD FRML MDRD: 69 ML/MIN/1.73SQ M
GLUCOSE P FAST SERPL-MCNC: 97 MG/DL (ref 65–99)
GLUCOSE SERPL-MCNC: 97 MG/DL (ref 65–140)
HBA1C MFR BLD: 5.7 %
HCT VFR BLD AUTO: 43.7 % (ref 34.8–46.1)
HDLC SERPL-MCNC: 50 MG/DL
HGB BLD-MCNC: 13.8 G/DL (ref 11.5–15.4)
IMM GRANULOCYTES # BLD AUTO: 0.01 THOUSAND/UL (ref 0–0.2)
IMM GRANULOCYTES NFR BLD AUTO: 0 % (ref 0–2)
LDLC SERPL CALC-MCNC: 97 MG/DL (ref 0–100)
LYMPHOCYTES # BLD AUTO: 1.89 THOUSANDS/ΜL (ref 0.6–4.47)
LYMPHOCYTES NFR BLD AUTO: 40 % (ref 14–44)
MCH RBC QN AUTO: 28.6 PG (ref 26.8–34.3)
MCHC RBC AUTO-ENTMCNC: 31.6 G/DL (ref 31.4–37.4)
MCV RBC AUTO: 91 FL (ref 82–98)
MONOCYTES # BLD AUTO: 0.44 THOUSAND/ΜL (ref 0.17–1.22)
MONOCYTES NFR BLD AUTO: 9 % (ref 4–12)
NEUTROPHILS # BLD AUTO: 2.22 THOUSANDS/ΜL (ref 1.85–7.62)
NEUTS SEG NFR BLD AUTO: 47 % (ref 43–75)
NONHDLC SERPL-MCNC: 120 MG/DL
NRBC BLD AUTO-RTO: 0 /100 WBCS
P AXIS: 7 DEGREES
PLATELET # BLD AUTO: 174 THOUSANDS/UL (ref 149–390)
PMV BLD AUTO: 10.2 FL (ref 8.9–12.7)
POTASSIUM SERPL-SCNC: 3.6 MMOL/L (ref 3.5–5.3)
PR INTERVAL: 146 MS
PROT SERPL-MCNC: 7.2 G/DL (ref 6.4–8.4)
QRS AXIS: 33 DEGREES
QRSD INTERVAL: 92 MS
QT INTERVAL: 448 MS
QTC INTERVAL: 484 MS
RBC # BLD AUTO: 4.82 MILLION/UL (ref 3.81–5.12)
SODIUM SERPL-SCNC: 141 MMOL/L (ref 135–147)
T WAVE AXIS: 51 DEGREES
TREPONEMA PALLIDUM IGG+IGM AB [PRESENCE] IN SERUM OR PLASMA BY IMMUNOASSAY: NORMAL
TRIGL SERPL-MCNC: 116 MG/DL (ref ?–150)
VENTRICULAR RATE: 70 BPM
VIT B12 SERPL-MCNC: 289 PG/ML (ref 180–914)
WBC # BLD AUTO: 4.73 THOUSAND/UL (ref 4.31–10.16)

## 2025-01-17 PROCEDURE — 83036 HEMOGLOBIN GLYCOSYLATED A1C: CPT | Performed by: NURSE PRACTITIONER

## 2025-01-17 PROCEDURE — 80061 LIPID PANEL: CPT | Performed by: PSYCHIATRY & NEUROLOGY

## 2025-01-17 PROCEDURE — 99223 1ST HOSP IP/OBS HIGH 75: CPT | Performed by: PSYCHIATRY & NEUROLOGY

## 2025-01-17 PROCEDURE — 80053 COMPREHEN METABOLIC PANEL: CPT | Performed by: PSYCHIATRY & NEUROLOGY

## 2025-01-17 PROCEDURE — 85025 COMPLETE CBC W/AUTO DIFF WBC: CPT | Performed by: PSYCHIATRY & NEUROLOGY

## 2025-01-17 PROCEDURE — 99253 IP/OBS CNSLTJ NEW/EST LOW 45: CPT | Performed by: NURSE PRACTITIONER

## 2025-01-17 PROCEDURE — 93010 ELECTROCARDIOGRAM REPORT: CPT | Performed by: INTERNAL MEDICINE

## 2025-01-17 PROCEDURE — 82306 VITAMIN D 25 HYDROXY: CPT | Performed by: PSYCHIATRY & NEUROLOGY

## 2025-01-17 PROCEDURE — 82607 VITAMIN B-12: CPT | Performed by: PSYCHIATRY & NEUROLOGY

## 2025-01-17 PROCEDURE — 86780 TREPONEMA PALLIDUM: CPT | Performed by: PSYCHIATRY & NEUROLOGY

## 2025-01-17 PROCEDURE — 82746 ASSAY OF FOLIC ACID SERUM: CPT | Performed by: PSYCHIATRY & NEUROLOGY

## 2025-01-17 RX ORDER — ERGOCALCIFEROL 1.25 MG/1
50000 CAPSULE, LIQUID FILLED ORAL WEEKLY
Status: DISCONTINUED | OUTPATIENT
Start: 2025-01-17 | End: 2025-01-22 | Stop reason: HOSPADM

## 2025-01-17 RX ORDER — ESCITALOPRAM OXALATE 10 MG/1
20 TABLET ORAL
Status: DISCONTINUED | OUTPATIENT
Start: 2025-01-17 | End: 2025-01-22 | Stop reason: HOSPADM

## 2025-01-17 RX ORDER — CYANOCOBALAMIN 1000 UG/ML
1000 INJECTION, SOLUTION INTRAMUSCULAR; SUBCUTANEOUS ONCE
Status: COMPLETED | OUTPATIENT
Start: 2025-01-17 | End: 2025-01-17

## 2025-01-17 RX ORDER — OXYBUTYNIN CHLORIDE 5 MG/1
5 TABLET, EXTENDED RELEASE ORAL DAILY
Status: DISCONTINUED | OUTPATIENT
Start: 2025-01-17 | End: 2025-01-22 | Stop reason: HOSPADM

## 2025-01-17 RX ORDER — URSODIOL 300 MG/1
300 CAPSULE ORAL 2 TIMES DAILY
Status: DISCONTINUED | OUTPATIENT
Start: 2025-01-17 | End: 2025-01-22 | Stop reason: HOSPADM

## 2025-01-17 RX ADMIN — URSODIOL 300 MG: 300 CAPSULE ORAL at 14:13

## 2025-01-17 RX ADMIN — CYANOCOBALAMIN 1000 MCG: 1000 INJECTION INTRAMUSCULAR; SUBCUTANEOUS at 17:31

## 2025-01-17 RX ADMIN — ERGOCALCIFEROL 50000 UNITS: 1.25 CAPSULE ORAL at 14:15

## 2025-01-17 RX ADMIN — ESCITALOPRAM OXALATE 20 MG: 10 TABLET ORAL at 21:25

## 2025-01-17 RX ADMIN — OXYBUTYNIN CHLORIDE 5 MG: 5 TABLET, EXTENDED RELEASE ORAL at 14:13

## 2025-01-17 RX ADMIN — MELATONIN TAB 3 MG 3 MG: 3 TAB at 21:25

## 2025-01-17 NOTE — EMTALA/ACUTE CARE TRANSFER
[facility] EMERGENCY DEPARTMENT  [address] [facility] [address]  Dept: [phone]      EMTALA TRANSFER CONSENT    NAME Sol Townsend                                         1960                              MRN [id number]    I have been informed of my rights regarding examination, treatment, and transfer   by Dr. Jackson Duncan MD    Benefits: Specialized equipment and/or services available at the receiving facility (Include comment)________________________    Risks: Potential for delay in receiving treatment, Potential deterioration of medical condition, Increased discomfort during transfer, Possible worsening of condition or death during transfer      Consent for Transfer:  I acknowledge that my medical condition has been evaluated and explained to me by the emergency department physician or other qualified medical person and/or my attending physician, who has recommended that I be transferred to the service of  Accepting Physician: Dr. Farias at Accepting Facility Name, City & State : [facility]. The above potential benefits of such transfer, the potential risks associated with such transfer, and the probable risks of not being transferred have been explained to me, and I fully understand them.  The doctor has explained that, in my case, the benefits of transfer outweigh the risks.  I agree to be transferred.    I authorize the performance of emergency medical procedures and treatments upon me in both transit and upon arrival at the receiving facility.  Additionally, I authorize the release of any and all medical records to the receiving facility and request they be transported with me, if possible.  I understand that the safest mode of transportation during a medical emergency is an ambulance and that the Hospital advocates the use of this mode of transport. Risks of traveling to the receiving facility by car, including absence of medical control, life  sustaining equipment, such as oxygen, and medical personnel has been explained to me and I fully understand them.    (MIRIAN CORRECT BOX BELOW)  [  ]  I consent to the stated transfer and to be transported by ambulance/helicopter.  [  ]  I consent to the stated transfer, but refuse transportation by ambulance and accept full responsibility for my transportation by car.  I understand the risks of non-ambulance transfers and I exonerate the Hospital and its staff from any deterioration in my condition that results from this refusal.    X___________________________________________    DATE  25  TIME________  Signature of patient or legally responsible individual signing on patient behalf           RELATIONSHIP TO PATIENT_________________________          Provider Certification    NAME Sol Townsend                                        [facility] 1960                              MRN [id number]    A medical screening exam was performed on the above named patient.  Based on the examination:    Condition Necessitating Transfer The primary encounter diagnosis was Delusional thoughts (HCC). A diagnosis of Encounter for psychological evaluation was also pertinent to this visit.    Patient Condition: The patient has been stabilized such that within reasonable medical probability, no material deterioration of the patient condition or the condition of the unborn child(veena) is likely to result from the transfer    Reason for Transfer: Level of Care needed not available at this facility (inpatient psychiatry)    Transfer Requirements: Facility [facility] available and qualified personnel available for treatment as acknowledged by Marquita U - [phone]  Agreed to accept transfer and to provide appropriate medical treatment as acknowledged by       Dr. Farias  Appropriate medical records of the examination and treatment of the patient are provided at the time of transfer   STAFF INITIAL WHEN COMPLETED  _______  Transfer will be performed by qualified personnel from [employer]  and appropriate transfer equipment as required, including the use of necessary and appropriate life support measures.    Provider Certification: I have examined the patient and explained the following risks and benefits of being transferred/refusing transfer to the patient/family:  General risk, such as traffic hazards, adverse weather conditions, rough terrain or turbulence, possible failure of equipment (including vehicle or aircraft), or consequences of actions of persons outside the control of the transport personnel, Unanticipated needs of medical equipment and personnel during transport, Risk of worsening condition, The possibility of a transport vehicle being unavailable, Consent was not obtained as patient is committed to psychiatric facility and transfer is mandated, The patient is stable for psychiatric transfer because they are medically stable, and is protected from harming him/herself or others during transport      Based on these reasonable risks and benefits to the patient and/or the unborn child(evena), and based upon the information available at the time of the patient’s examination, I certify that the medical benefits reasonably to be expected from the provision of appropriate medical treatments at another medical facility outweigh the increasing risks, if any, to the individual’s medical condition, and in the case of labor to the unborn child, from effecting the transfer.    X____________________________________________ DATE 01/16/25        TIME_______      ORIGINAL - SEND TO MEDICAL RECORDS   COPY - SEND WITH PATIENT DURING TRANSFER
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
## 2025-01-17 NOTE — PLAN OF CARE
Problem: Alteration in Thoughts and Perception  Goal: Treatment Goal: Gain control of psychotic behaviors/thinking, reduce/eliminate presenting symptoms and demonstrate improved reality functioning upon discharge  Outcome: Progressing     Problem: Ineffective Coping  Goal: Cooperates with admission process  Description: Interventions:   - Complete admission process  Outcome: Completed     Problem: Risk for Self Injury/Neglect  Goal: Treatment Goal: Remain safe during length of stay, learn and adopt new coping skills, and be free of self-injurious ideation, impulses and acts at the time of discharge  Outcome: Progressing     Problem: Anxiety  Goal: Anxiety is at manageable level  Description: Interventions:  - Assess and monitor patient's anxiety level.   - Monitor for signs and symptoms (heart palpitations, chest pain, shortness of breath, headaches, nausea, feeling jumpy, restlessness, irritable, apprehensive).   - Collaborate with interdisciplinary team and initiate plan and interventions as ordered.  - [place] patient to unit/surroundings  - Explain treatment plan  - Encourage participation in care  - Encourage verbalization of concerns/fears  - Identify coping mechanisms  - Assist in developing anxiety-reducing skills  - Administer/offer alternative therapies  - Limit or eliminate stimulants  Outcome: Not Progressing     Problem: Knowledge Deficit  Goal: Patient/family/caregiver demonstrates understanding of disease process, treatment plan, medications, and discharge instructions  Description: Complete learning assessment and assess knowledge base.  Interventions:  - Provide teaching at level of understanding  - Provide teaching via preferred learning methods  Outcome: Progressing
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
## 2025-01-17 NOTE — ASSESSMENT & PLAN NOTE
Vitamin D level 21.3  Patient will start on ergocalciferol 50,000 units p.o. weekly x 8 weeks  Patient needs to have a repeat vitamin B12 level drawn in 10 to 12 weeks with her PCP

## 2025-01-17 NOTE — NURSING NOTE
Patient tearful x2 when discussing daughter. Reports daughter is making lies about her so she would be admitted. Social w/ roommate. Med and meal compliant. No behavioral issues.

## 2025-01-17 NOTE — PROGRESS NOTES
"Pt attended all groups.  Pt mentioned she is [place] and does not have hearing aid at hospital. Pt anxious, bright and minimizes the severity of her reasons for admission stating she wanted to \"move on\" and get back to her Grandchildren and job acknowledged that she also lost her money.      01/17/25 0900 01/17/25 1000 01/17/25 1330   Activity/Group Checklist   Group Exercise Other (Comment)  (MH Recovery: Guiding principles) Self Esteem   Attendance Attended Attended Attended;Other (Comment)  (Pt left early with doctor)   Attendance Duration (min) 46-60 31-45 16-30   Interactions Interacted appropriately Interacted appropriately Did not interact   Affect/Mood Appropriate Other (Comment)  (anxious) Appropriate   Goals Achieved Able to listen to others;Able to engage in interactions Identified triggers;Identified feelings;Identified relapse prevention strategies;Able to engage in interactions;Able to listen to others;Able to manage/cope with feelings;Able to reflect/comment on own behavior;Verbalized increased hopefulness;Able to self-disclose Able to listen to others      01/17/25 1445   Activity/Group Checklist   Group  --    Attendance Other (Comment)  (assessment with DR Dixon)   Attendance Duration (min)  --    Interactions  --    Affect/Mood  --    Goals Achieved  --            "

## 2025-01-17 NOTE — NURSING NOTE
Call placed to [facility] pharmacy in [place], [place] to verify medications. Patient reports this is her preferred pharmacy. Pharmacist stated they have never filled medications.    Call placed to [facility] in [place], [place].  Patient was prescribed Oxybutin 5mg daily - Last filled 12/29/24.     Call placed to [facility] office to verify pharmacy on file. Patient using [facility] in [place], [place].    Call placed to [facility] in [place], [place].  Patient was prescribed Oxybutin 5mg daily - Last filled 9/2024. Pharmacist states patient has not filled any other routine medications. Hx of antibiotics, eye gtts, and one time fill of Meloxicam w/ this location.     Unable to get confirmation that patient was filling lexapro or ursodiol.

## 2025-01-17 NOTE — NURSING NOTE
Patient is pleasant and cooperative. A&O x4 but forgetfulness present. Repeatedly asking the same questions. Visible intermittently. Minimally social w/ peers. Appetite is fair. Hygiene is excellent. Medication compliant. Anxious and tearful when discussing events leading to hospitalization. Insight and judgement appear poor. Patient states she has no issues and all this happened because she was scammed. Reports she needs to leave as she has a responsibility to care for grandchildren. Denies psychiatric symptoms. Continuous rounding maintained.
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
## 2025-01-17 NOTE — CONSULTS
Consultation - Hospitalist   Name: Sol Townsend 64 y.o. female I MRN: [id number]  Unit/Bed#: [facility] 658-02 I Date of Admission: 1/16/2025   Date of Service: 1/17/2025 I Hospital Day: 1   Inpatient consult for Medical Clearance for  patient  Consult performed by: WALTER Cardenas  Consult ordered by: Kenzie Farias MD        Physician Requesting Evaluation: Kecia Dixon DO   Reason for Evaluation / Principal Problem: Medical clearance for psychiatric admission    Assessment & Plan  Medical clearance for psychiatric admission  Vital signs stable afebrile patient seen and examined by myself labs from today were reviewed sodium 141 potassium 3.6 creatinine 0.89  Patient medically stable for admit  Please reach out to  IM with any medical questions or concerns  Vitamin B12 deficiency  Patient's vitamin B12 level today was 289  Patient will receive a one-time dose of IM vitamin B12 x 1 today and starting tomorrow she will receive oral vitamin B12 supplementation  Patient needs to have a repeat vitamin B12 level with her PCP in 10 to 12 weeks  Vitamin D deficiency  Vitamin D level 21.3  Patient will start on ergocalciferol 50,000 units p.o. weekly x 8 weeks  Patient needs to have a repeat vitamin B12 level drawn in 10 to 12 weeks with her PCP  BMI 31.0-31.9,adult  Patient's BMI is 31.78  Lifestyle modifications  Serum total bilirubin elevated  T. bili today 1.05  Patient has known fatty liver  Patient will continue to take her outpatient Actigall  Patient will have a repeat CMP to make sure her bilirubin is stable on Tuesday, 1/21/2025  Squamous cell cancer of tongue (HCC)  Patient has a history of squamous cell carcinoma of her tongue on the left side   Patient had a resection of her tongue and her lymph nodes on the left side of her neck removed approximately 6 to 7 years ago  Patient is in remission  Patient speaks with a slight lisp when she speaks  She is status post radiation therapy and she sees  Dr. Kilpatrick  Urinary frequency  Patient has had a history of a hysterectomy as well as a bladder lift  Patient continues to use oxybutynin which she will remain on and that keeps her from having urinary incontinence      Collaboration of Care: Were Recommendations Directly Discussed with Primary Treatment Team? - No     History of Present Illness   Sol Townsend is a 64 y.o. female who is originally admitted to the psychiatry service due to depression, delusions as well as anxious being held on a 302 which was upheld from her family. We are consulted for medical clearance for admission to Behavioral Health Unit and treatment of underlying psychiatric illness.      Patient has a past medical history of fatty liver, squamous cell carcinoma of her tongue, urinary incontinence, now with vitamin B12 and vitamin D deficiency.  She has a BMI of 31.78.  She is now transferred to the U for stabilization of her mental health issues.    Review of Systems   Constitutional:  Negative for chills and fever.   HENT:  Negative for ear pain and sore throat.    Eyes:  Negative for pain and visual disturbance.   Respiratory:  Negative for cough and shortness of breath.    Cardiovascular:  Negative for chest pain and palpitations.   Gastrointestinal:  Negative for abdominal pain and vomiting.   Genitourinary:  Negative for dysuria and hematuria.   Musculoskeletal:  Negative for arthralgias and back pain.   Skin:  Negative for color change and rash.   Neurological:  Negative for seizures and syncope.   Psychiatric/Behavioral:  Positive for decreased concentration and dysphoric mood. The patient is nervous/anxious.    All other systems reviewed and are negative.      Historical Information   Past Medical History:   Diagnosis Date    Anxiety     Biliary liver cirrhosis (HCC)      Past Surgical History:   Procedure Laterality Date    BLADDER SURGERY      CHOLECYSTECTOMY      FOOT FRACTURE SURGERY Right     HYSTERECTOMY       Social  "History     Tobacco Use    Smoking status: Never    Smokeless tobacco: Never   Substance and Sexual Activity    Alcohol use: Not Currently    Drug use: Not Currently    Sexual activity: Not on file     E-Cigarette/Vaping     E-Cigarette/Vaping Substances       Marital Status: /Civil Union    Meds/Allergies   I have reviewed home medications with patient personally.  Prior to Admission medications    Medication Sig Start Date End Date Taking? Authorizing Provider   escitalopram (LEXAPRO) 10 mg tablet Take 20 mg by mouth daily   Yes Historical Provider, MD   ursodiol (ACTIGALL) 250 mg tablet Take 500 mg by mouth daily   Yes Historical Provider, MD   morphine (MSIR) 15 mg tablet Take 1 tablet (15 mg total) by mouth every 8 (eight) hours as needed for severe pain for up to 6 dosesMax Daily Amount: 45 mg  Patient not taking: Reported on 1/16/2025 8/18/21   Wally Rosas, DO     Allergies   Allergen Reactions    Amoxicillin Anaphylaxis     swelling    Ciprofloxacin Rash    Sulfa Antibiotics Rash       Objective :  Temp:  [97.3 °F (36.3 °C)-97.5 °F (36.4 °C)] 97.5 °F (36.4 °C)  HR:  [71-79] 75  BP: (135-174)/(84-88) 135/84  Resp:  [16-18] 16  SpO2:  [92 %-96 %] 92 %  O2 Device: None (Room air)    Height: 5' 5\" (165.1 cm) (01/16/25 2300)  Weight - Scale: 86.6 kg (191 lb) (01/16/25 2300)  Physical Exam  Constitutional:       Appearance: Normal appearance.   HENT:      Head: Normocephalic and atraumatic.      Nose: Nose normal.      Mouth/Throat:      Mouth: Mucous membranes are moist.      Pharynx: Oropharynx is clear.   Eyes:      Extraocular Movements: Extraocular movements intact.      Pupils: Pupils are equal, round, and reactive to light.   Cardiovascular:      Rate and Rhythm: Normal rate and regular rhythm.      Pulses: Normal pulses.      Heart sounds: Normal heart sounds.   Pulmonary:      Effort: Pulmonary effort is normal.      Breath sounds: Normal breath sounds.   Abdominal:      General: Abdomen is " "flat. Bowel sounds are normal.      Palpations: Abdomen is soft.   Musculoskeletal:         General: Normal range of motion.      Cervical back: Normal range of motion and neck supple.   Skin:     General: Skin is warm and dry.   Neurological:      Mental Status: She is alert and oriented to person, place, and time.   Psychiatric:         Attention and Perception: Attention normal.         Mood and Affect: Affect normal. Mood is anxious.         Speech: Speech normal.         Behavior: Behavior normal. Behavior is cooperative.         Thought Content: Thought content normal.         Cognition and Memory: Cognition normal.         Judgment: Judgment normal.      Comments: When she speaks she had tongue cancer and she has a slight lisp           Lab Results: I have reviewed the following results:  Results from last 7 days   Lab Units 01/17/25  0527   WBC Thousand/uL 4.73   HEMOGLOBIN g/dL 13.8   HEMATOCRIT % 43.7   PLATELETS Thousands/uL 174   SEGS PCT % 47   LYMPHO PCT % 40   MONO PCT % 9   EOS PCT % 3     Results from last 7 days   Lab Units 01/17/25  0527   SODIUM mmol/L 141   POTASSIUM mmol/L 3.6   CHLORIDE mmol/L 105   CO2 mmol/L 27   BUN mg/dL 9   CREATININE mg/dL 0.89   ANION GAP mmol/L 9   CALCIUM mg/dL 9.1   ALBUMIN g/dL 3.9   TOTAL BILIRUBIN mg/dL 1.05*   ALK PHOS U/L 64   ALT U/L 19   AST U/L 25   GLUCOSE RANDOM mg/dL 97             No results found for: \"HGBA1C\"        Imaging Results Review: No pertinent imaging studies reviewed.  Other Study Results Review: EKG was reviewed.   1/16/2025 twelve-lead EKG reviewed by myself as well as interpreted myself ventricular rate 70 QT interval 448 QTc 484 normal sinus rhythm normal EKG no acute EKG findings noted in this EKG.  There are no previous EKGs to compare with.  Administrative Statements   I have spent a total time of 45 minutes in caring for this patient on the day of the visit/encounter including Diagnostic results, Prognosis, Risks and benefits of tx " options, Instructions for management, Patient and family education, Importance of tx compliance, Risk factor reductions, Impressions, Counseling / Coordination of care, Documenting in the medical record, Reviewing / ordering tests, medicine, procedures  , Obtaining or reviewing history  , and Communicating with other healthcare professionals .  ** Please Note: This note has been constructed using a voice recognition system. **
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
## 2025-01-17 NOTE — ASSESSMENT & PLAN NOTE
Continue Lexapro 20 mg HS for depressive symptoms    Will hold off on starting an antipsychotic as patient was able to verbalize that she was scammed and no longer believes she was communicating with the lead tao of the [employer].     Neuropsych eval to rule out cognitive deficits

## 2025-01-17 NOTE — ASSESSMENT & PLAN NOTE
Vital signs stable afebrile patient seen and examined by myself labs from today were reviewed sodium 141 potassium 3.6 creatinine 0.89  Patient medically stable for admit  Please reach out to [facility] with any medical questions or concerns

## 2025-01-17 NOTE — ASSESSMENT & PLAN NOTE
Patient has a history of squamous cell carcinoma of her tongue on the left side   Patient had a resection of her tongue and her lymph nodes on the left side of her neck removed approximately 6 to 7 years ago  Patient is in remission  Patient speaks with a slight lisp when she speaks  She is status post radiation therapy and she sees Dr. Kilpatrick

## 2025-01-17 NOTE — PROGRESS NOTES
01/17/25 1519   Team Meeting   Meeting Type Tx Team Meeting   Initial Conference Date 01/17/25   Team Members Present   Team Members Present Physician;Nurse;   Physician Team Member Destiny   Nursing Team Member Han ARREOLA   Care Management Team Member Han MARY   Patient/Family Present   Patient Present Yes   Patient's Family Present No     Tx plan was reviewed and discussed with Pt. Pt was encouraged to attend groups. Medication was discussed with Pt. Pt signed tx plan.

## 2025-01-17 NOTE — TREATMENT PLAN
TREATMENT PLAN REVIEW - Behavioral Health Sol Madridestebandhaval 64 y.o. 1960 female MRN: [id number]    [facility] 6B [facility] Room / Bed: [facility] 658/[facility] 658-02 Encounter: [id number]          Admit Date/Time:  1/16/2025  9:36 PM    Treatment Team:   Kecia Dixon, DO Rsoa Johnson, BRE Barnes, MSW  Peg Huitron, PhD  Stacia Sharma    Diagnosis: Principal Problem:    Unspecified mood disorder vs MDD with psychotic features vs delusional disorder  Active Problems:    Medical clearance for psychiatric admission    Vitamin B12 deficiency    Vitamin D deficiency    BMI 31.0-31.9,adult    Serum total bilirubin elevated    Squamous cell cancer of tongue (HCC)    Urinary frequency      Patient Strengths/Assets: Capable of independent living, cooperative, family ties, good support system, motivated, voluntary commitment, supportive family and friends, well educated, work skills    Patient Barriers/Limitations: Financial instability, impaired cognition, marital conflict, poor reasoning ability    Short Term Goals: decrease in depressive symptoms, decrease in anxiety symptoms, improvement in insight, improvement in reality testing, improvement in reasoning ability    Long Term Goals: improvement in anxiety, resolution of depressive symptoms, improvement in reality testing, improvement in reasoning ability, improved insight, acceptance of need for psychiatric follow up after discharge, appropriate interaction with family, stable living arrangements upon discharge    Progress Towards Goals: continue psychiatric medications as prescribed    Recommended Treatment: medication management, patient medication education, group therapy, milieu therapy, continued Behavioral Health psychiatric evaluation/assessment process    Treatment Frequency: daily medication monitoring, group and milieu therapy daily, monitoring through interdisciplinary rounds, monitoring  through weekly patient care conferences    Expected Discharge Date:  TBD    Discharge Plan: referrals as indicated    Treatment Plan Created/Updated By: Kecia Dixon DO

## 2025-01-17 NOTE — ASSESSMENT & PLAN NOTE
Patient has had a history of a hysterectomy as well as a bladder lift  Patient continues to use oxybutynin which she will remain on and that keeps her from having urinary incontinence

## 2025-01-17 NOTE — ASSESSMENT & PLAN NOTE
TALHA stokes today 1.05  Patient has known fatty liver  Patient will continue to take her outpatient Actigall  Patient will have a repeat CMP to make sure her bilirubin is stable on Tuesday, 1/21/2025

## 2025-01-17 NOTE — ASSESSMENT & PLAN NOTE
Patient's vitamin B12 level today was 289  Patient will receive a one-time dose of IM vitamin B12 x 1 today and starting tomorrow she will receive oral vitamin B12 supplementation  Patient needs to have a repeat vitamin B12 level with her PCP in 10 to 12 weeks

## 2025-01-18 PROCEDURE — 99232 SBSQ HOSP IP/OBS MODERATE 35: CPT

## 2025-01-18 RX ADMIN — CYANOCOBALAMIN TAB 1000 MCG 1000 MCG: 1000 TAB at 08:24

## 2025-01-18 RX ADMIN — OXYBUTYNIN CHLORIDE 5 MG: 5 TABLET, EXTENDED RELEASE ORAL at 08:24

## 2025-01-18 RX ADMIN — ESCITALOPRAM OXALATE 20 MG: 10 TABLET ORAL at 21:39

## 2025-01-18 RX ADMIN — MELATONIN TAB 3 MG 3 MG: 3 TAB at 21:39

## 2025-01-18 RX ADMIN — URSODIOL 300 MG: 300 CAPSULE ORAL at 08:34

## 2025-01-18 RX ADMIN — URSODIOL 300 MG: 300 CAPSULE ORAL at 17:14

## 2025-01-18 RX ADMIN — ACETAMINOPHEN 650 MG: 325 TABLET, FILM COATED ORAL at 13:18

## 2025-01-18 NOTE — NURSING NOTE
"Patient visible throughout milieu so far this shift. Patient pleasant on approach, denied SI/HI/AVH. Patient reported \"I feel happy.\" Patient accepted AM medication. Patient displays steady gait on the unit. Patient provided with ADL supplies, currently showering. Patient able to and encouraged to inform staff of any needs.   "

## 2025-01-18 NOTE — PROGRESS NOTES
Jak was seen and treated in our emergency department on 4/8/2024.  She may return to school on 04/10/2024.  Unable to attend school due to illness    If you have any questions or concerns, please don't hesitate to call.      Shauna Dominguez MD Progress Note - Behavioral Health   Name: Sol Townsend 64 y.o. female I MRN: [id number]   Unit/Bed#: [facility] 658-02 I Date of Admission: 1/16/2025   Date of Service: 1/18/2025 I Hospital Day: 2         Assessment & Plan  Unspecified mood disorder vs MDD with psychotic features vs delusional disorder  Continue Lexapro 20 mg HS for depressive symptoms    Will hold off on starting an antipsychotic as patient was able to verbalize that she was scammed and no longer believes she was communicating with the lead tao of the [employer].     Neuropsych eval to rule out cognitive deficits  Vitamin B12 deficiency  SL[facility] managing   Vitamin D deficiency  [facility]IM managing         Recommended Treatment:    Treatment plan and medication changes discussed and per the attending physician the plan is:     1.Continue with group therapy, milieu therapy and occupational therapy  2.Behavioral Health checks every 15 minutes  3.Continue frequent safety checks and vitals per unit protocol  4.Continue with SLIM medical management as indicated  5.Continue with current medication regimen  6.Will review labs in the a.m.  7.Disposition Planning: Discharge planning and efforts remain ongoing     Planned medication and treatment changes:    All current active medications have been reviewed  Encourage group therapy, milieu therapy and occupational therapy  Behavioral Health checks for safety monitoring  Continue treatment with group therapy, milieu therapy and occupational therapy  Discharge planning  Continue current medications:    Current medications:  Current Facility-Administered Medications   Medication Dose Route Frequency Provider Last Rate    acetaminophen  650 mg Oral Q4H PRN Kenzie Farias MD      acetaminophen  650 mg Oral Q4H PRN Kenzie Farias MD      acetaminophen  975 mg Oral Q6H PRN Kenzie Farias MD      aluminum-magnesium hydroxide-simethicone  30 mL Oral Q4H PRN Kenzie Farias MD      cyanocobalamin  1,000 mcg Oral  Daily WALTER Cardenas      ergocalciferol  50,000 Units Oral Weekly WALTER Cardenas      escitalopram  20 mg Oral HS Kecia Dixon DO      haloperidol lactate  5 mg Intramuscular Q4H PRN Max 4/day Kenzie Farias MD      hydrOXYzine HCL  25 mg Oral Q6H PRN Max 4/day Kenzie Farias MD      hydrOXYzine HCL  50 mg Oral Q6H PRN Max 4/day Kenzie Farias MD      melatonin  3 mg Oral HS Kenzie Farias MD      nicotine polacrilex  4 mg Oral Q2H PRN Kenzie Farias MD      oxybutynin  5 mg Oral Daily WALTER Cardenas      propranolol  5 mg Oral Q8H PRN Kenzie Farias MD      risperiDONE  0.25 mg Oral Q4H PRN Max 6/day Kenzie Farias MD      risperiDONE  0.5 mg Oral Q4H PRN Max 3/day Kenzie Farias MD      risperiDONE  1 mg Oral Q2H PRN Max 3/day Kenzie Farias MD      traZODone  50 mg Oral Q6H PRN Max 3/day Kenzie Farias MD      ursodiol  300 mg Oral BID WALTER Cardenas         Risks / Benefits of Treatment:    Risks, benefits, and possible side effects of medications explained to patient and patient verbalizes understanding and agreement for treatment.    Subjective:    Behavior over the last 24 hours: unchanged.     Per staff, patient has been cooperative on the unit. She is compliant with her medications.  She continues to report anxiety.      Sol was seen in the group room away from peers for psychiatric follow up.  She presents as anxious and hypertalkative.  She is redirectable.  She reports that she continues with some anxiety but denies any current depression.  She reports she plans on  her  and plans to move in with a friend.  She reports anxiety because she normally babysits her grandchildren and she is not able to do that right now.  She was encouraged to focus on her treatment.  She also feels her daughter lied on the 302 as she believes her daughter said she abandoned her grandchildren which she denies doing.  She was provided  reassurance and again encouraged to focus on treatment.  She reports difficulty sleeping last night because of noise on the unit.  We reviewed as needed medication for insomnia.  She denies suicidal or homicidal ideation intent or plan.  She feels remorseful about sending money to online [employer].  She continues to understand that she was scammed by a person on line.  She reports that she feels she fell for the scam because she was feeling lonely as her and her  do not have a good relationship.  She denies auditory or visual hallucinations and does not appear internally preoccupied.     Sleep: slept off and on  Appetite: normal  Medication side effects: No   ROS: no complaints    Mental Status Evaluation:    Appearance:  adequate grooming, wearing hospital clothes   Behavior:  pleasant, cooperative   Speech:  hypertalkative   Mood:  anxious   Affect:  expansive   Thought Process:  circumstantial   Associations: intact associations   Thought Content:  negative thoughts, ruminating thoughts   Perceptual Disturbances: none   Risk Potential: Suicidal ideation - None  Homicidal ideation - None  Potential for aggression - No   Sensorium:  oriented to person, place, and time/date   Memory:  recent memory intact   Consciousness:  alert and awake   Attention/Concentration: attention span and concentration appear shorter than expected for age   Insight:  partial   Judgment: limited   Gait/Station: normal gait/station   Motor Activity: no abnormal movements     Vital signs in last 24 hours:    Temp:  [97.9 °F (36.6 °C)-98.3 °F (36.8 °C)] 98.3 °F (36.8 °C)  HR:  [73-95] 73  BP: (129-160)/(77-92) 129/77  Resp:  [17-18] 18  SpO2:  [94 %-98 %] 98 %  O2 Device: None (Room air)         Laboratory results: I have personally reviewed all pertinent laboratory/tests results    Results from the past 24 hours: No results found for this or any previous visit (from the past 24 hours).  Most Recent Labs:   Lab Results   Component Value  Date    WBC 4.73 01/17/2025    RBC 4.82 01/17/2025    HGB 13.8 01/17/2025    HCT 43.7 01/17/2025     01/17/2025    RDW 13.3 01/17/2025    NEUTROABS 2.22 01/17/2025    SODIUM 141 01/17/2025    K 3.6 01/17/2025     01/17/2025    CO2 27 01/17/2025    BUN 9 01/17/2025    CREATININE 0.89 01/17/2025    GLUC 97 01/17/2025    CALCIUM 9.1 01/17/2025    AST 25 01/17/2025    ALT 19 01/17/2025    ALKPHOS 64 01/17/2025    TP 7.2 01/17/2025    ALB 3.9 01/17/2025    TBILI 1.05 (H) 01/17/2025    CHOLESTEROL 170 01/17/2025    HDL 50 01/17/2025    TRIG 116 01/17/2025    LDLCALC 97 01/17/2025    NONHDLC 120 01/17/2025    DVT4QDHPYRQQ 1.964 01/16/2025    SYPHILISAB Non-reactive 01/17/2025    HGBA1C 5.7 (H) 01/17/2025     01/17/2025       Suicide/Homicide Risk Assessment:    Risk of Harm to Self:   Nursing Suicide Risk Assessment Last 24 hours: C-SSRS Risk (Since Last Contact)  Calculated C-SSRS Risk Score (Since Last Contact): No Risk Indicated    Risk of Harm to Others:  Nursing Homicide Risk Assessment: Violence Risk to Others: Denies within past 6 months    The following interventions are recommended: Behavioral Health checks for safety monitoring, continued hospitalization on locked unit    Progress Toward Goals: progressing, attends groups, participates in milieu therapy, still anxious, interrupted sleep,  compliant with meals and medication    Counseling / Coordination of Care:    Total floor / unit time spent today 30 minutes. Greater than 50% of total time was spent with the patient and / or family counseling and / or coordination of care. A description of counseling / coordination of care:  Patient's progress discussed with staff in treatment team meeting.    Administrative Statements   I have spent a total time of 30 minutes in caring for this patient on the day of the visit/encounter including Diagnostic results, Documenting in the medical record, Reviewing / ordering tests, medicine, procedures  ,  Obtaining or reviewing history  , and Communicating with other healthcare professionals .    WALTER Kraus 01/18/25

## 2025-01-18 NOTE — NURSING NOTE
Patient calm, cooperative, talkative, moderately visible on millieu, and sociable with select peers.  She denies depression, HI/SI/AVH and reports some anxiety relative to daughter reporting lies about her care of grandchildren.  She was allowed to express herself and calming methods introduced.  She takes medications as scheduled and and is able to make needs known.  She has no unmet needs at this time and was provided word search puzzles as requested.  Safety plan reviewed.

## 2025-01-18 NOTE — TREATMENT TEAM
Patient reports she does not wish to speak with her daughter Jennifer nor does she want her daughter to have information about her inpatient stay at this time.  EMILIO signed for son Hammad.

## 2025-01-18 NOTE — ASSESSMENT & PLAN NOTE
Continue Lexapro 20 mg HS for depressive symptoms    Will hold off on starting an antipsychotic as patient was able to verbalize that she was scammed and no longer believes she was communicating with the lead tao of the [employer].     Neuropsych eval to rule out cognitive deficits

## 2025-01-18 NOTE — PLAN OF CARE
Problem: Alteration in Thoughts and Perception  Goal: Treatment Goal: Gain control of psychotic behaviors/thinking, reduce/eliminate presenting symptoms and demonstrate improved reality functioning upon discharge  Outcome: Progressing  Goal: Verbalize thoughts and feelings  Description: Interventions:  - Promote a nonjudgmental and trusting relationship with the patient through active listening and therapeutic communication  - Assess patient's level of functioning, behavior and potential for risk  - Engage patient in 1 on 1 interactions  - Encourage patient to express fears, feelings, frustrations, and discuss symptoms    - [place] patient to reality, help patient recognize reality-based thinking   - Administer medications as ordered and assess for potential side effects  - Provide the patient education related to the signs and symptoms of the illness and desired effects of prescribed medications  Outcome: Progressing  Goal: Refrain from acting on delusional thinking/internal stimuli  Description: Interventions:  - Monitor patient closely, per order   - Utilize least restrictive measures   - Set reasonable limits, give positive feedback for acceptable   - Administer medications as ordered and monitor of potential side effects  Outcome: Progressing     Problem: Risk for Self Injury/Neglect  Goal: Treatment Goal: Remain safe during length of stay, learn and adopt new coping skills, and be free of self-injurious ideation, impulses and acts at the time of discharge  Outcome: Progressing     Problem: Depression  Goal: Treatment Goal: Demonstrate behavioral control of depressive symptoms, verbalize feelings of improved mood/affect, and adopt new coping skills prior to discharge  Outcome: Progressing     Problem: Anxiety  Goal: Anxiety is at manageable level  Description: Interventions:  - Assess and monitor patient's anxiety level.   - Monitor for signs and symptoms (heart palpitations, chest pain, shortness of breath,  headaches, nausea, feeling jumpy, restlessness, irritable, apprehensive).   - Collaborate with interdisciplinary team and initiate plan and interventions as ordered.  - [place] patient to unit/surroundings  - Explain treatment plan  - Encourage participation in care  - Encourage verbalization of concerns/fears  - Identify coping mechanisms  - Assist in developing anxiety-reducing skills  - Administer/offer alternative therapies  - Limit or eliminate stimulants  Outcome: Progressing     Problem: Knowledge Deficit  Goal: Patient/family/caregiver demonstrates understanding of disease process, treatment plan, medications, and discharge instructions  Description: Complete learning assessment and assess knowledge base.  Interventions:  - Provide teaching at level of understanding  - Provide teaching via preferred learning methods  Outcome: Progressing     Problem: DISCHARGE PLANNING - CARE MANAGEMENT  Goal: Discharge to post-acute care or home with appropriate resources  Description: INTERVENTIONS:  - Conduct assessment to determine patient/family and health care team treatment goals, and need for post-acute services based on payer coverage, community resources, and patient preferences, and barriers to discharge  - Address psychosocial, clinical, and financial barriers to discharge as identified in assessment in conjunction with the patient/family and health care team  - Arrange appropriate level of post-acute services according to patient’s   needs and preference and payer coverage in collaboration with the physician and health care team  - Communicate with and update the patient/family, physician, and health care team regarding progress on the discharge plan  - Arrange appropriate transportation to post-acute venues  Outcome: Progressing

## 2025-01-19 PROCEDURE — 99232 SBSQ HOSP IP/OBS MODERATE 35: CPT

## 2025-01-19 RX ORDER — TRAZODONE HYDROCHLORIDE 50 MG/1
50 TABLET, FILM COATED ORAL
Status: DISCONTINUED | OUTPATIENT
Start: 2025-01-19 | End: 2025-01-22 | Stop reason: HOSPADM

## 2025-01-19 RX ADMIN — TRAZODONE HYDROCHLORIDE 50 MG: 50 TABLET ORAL at 21:50

## 2025-01-19 RX ADMIN — CYANOCOBALAMIN TAB 1000 MCG 1000 MCG: 1000 TAB at 08:25

## 2025-01-19 RX ADMIN — URSODIOL 300 MG: 300 CAPSULE ORAL at 08:25

## 2025-01-19 RX ADMIN — MELATONIN TAB 3 MG 3 MG: 3 TAB at 21:50

## 2025-01-19 RX ADMIN — ESCITALOPRAM OXALATE 20 MG: 10 TABLET ORAL at 21:50

## 2025-01-19 RX ADMIN — OXYBUTYNIN CHLORIDE 5 MG: 5 TABLET, EXTENDED RELEASE ORAL at 08:25

## 2025-01-19 RX ADMIN — URSODIOL 300 MG: 300 CAPSULE ORAL at 17:49

## 2025-01-19 NOTE — PLAN OF CARE
Problem: Alteration in Thoughts and Perception  Goal: Treatment Goal: Gain control of psychotic behaviors/thinking, reduce/eliminate presenting symptoms and demonstrate improved reality functioning upon discharge  Outcome: Progressing  Goal: Verbalize thoughts and feelings  Description: Interventions:  - Promote a nonjudgmental and trusting relationship with the patient through active listening and therapeutic communication  - Assess patient's level of functioning, behavior and potential for risk  - Engage patient in 1 on 1 interactions  - Encourage patient to express fears, feelings, frustrations, and discuss symptoms    - [place] patient to reality, help patient recognize reality-based thinking   - Administer medications as ordered and assess for potential side effects  - Provide the patient education related to the signs and symptoms of the illness and desired effects of prescribed medications  Outcome: Progressing  Goal: Refrain from acting on delusional thinking/internal stimuli  Description: Interventions:  - Monitor patient closely, per order   - Utilize least restrictive measures   - Set reasonable limits, give positive feedback for acceptable   - Administer medications as ordered and monitor of potential side effects  Outcome: Progressing     Problem: Risk for Self Injury/Neglect  Goal: Treatment Goal: Remain safe during length of stay, learn and adopt new coping skills, and be free of self-injurious ideation, impulses and acts at the time of discharge  Outcome: Progressing     Problem: Depression  Goal: Treatment Goal: Demonstrate behavioral control of depressive symptoms, verbalize feelings of improved mood/affect, and adopt new coping skills prior to discharge  Outcome: Progressing     Problem: Anxiety  Goal: Anxiety is at manageable level  Description: Interventions:  - Assess and monitor patient's anxiety level.   - Monitor for signs and symptoms (heart palpitations, chest pain, shortness of breath,  headaches, nausea, feeling jumpy, restlessness, irritable, apprehensive).   - Collaborate with interdisciplinary team and initiate plan and interventions as ordered.  - [place] patient to unit/surroundings  - Explain treatment plan  - Encourage participation in care  - Encourage verbalization of concerns/fears  - Identify coping mechanisms  - Assist in developing anxiety-reducing skills  - Administer/offer alternative therapies  - Limit or eliminate stimulants  Outcome: Progressing     Problem: Knowledge Deficit  Goal: Patient/family/caregiver demonstrates understanding of disease process, treatment plan, medications, and discharge instructions  Description: Complete learning assessment and assess knowledge base.  Interventions:  - Provide teaching at level of understanding  - Provide teaching via preferred learning methods  Outcome: Progressing     Problem: DISCHARGE PLANNING - CARE MANAGEMENT  Goal: Discharge to post-acute care or home with appropriate resources  Description: INTERVENTIONS:  - Conduct assessment to determine patient/family and health care team treatment goals, and need for post-acute services based on payer coverage, community resources, and patient preferences, and barriers to discharge  - Address psychosocial, clinical, and financial barriers to discharge as identified in assessment in conjunction with the patient/family and health care team  - Arrange appropriate level of post-acute services according to patient’s   needs and preference and payer coverage in collaboration with the physician and health care team  - Communicate with and update the patient/family, physician, and health care team regarding progress on the discharge plan  - Arrange appropriate transportation to post-acute venues  Outcome: Progressing

## 2025-01-19 NOTE — ASSESSMENT & PLAN NOTE
Continue Lexapro 20 mg HS for depressive symptoms    Will hold off on starting an antipsychotic as patient was able to verbalize that she was scammed and no longer believes she was communicating with the lead tao of the [employer].     Neuropsych eval to rule out cognitive deficits

## 2025-01-19 NOTE — NURSING NOTE
"Patient calm, cooperative, visible on millieu, and sociable with select peers.  She reports feeling \"good\" and denies depression, anxiety, HI/SI/AVH and is able to make needs known. Patient takes medications as scheduled.  Safety plan reviewed and patient has no unmet needs at this time.    "

## 2025-01-19 NOTE — NURSING NOTE
"Patient visible throughout milieu, interacting with staff and peers. Patient pleasant on approach, although remains preoccupied regarding her reason for admission. Patient reassured and patient reports she is \"just frustrated.\" Patient denied S/HI/AVH. Patient displays steady gait. Patient accepted all medications. Physical assessment unremarkable. Patient able to and encouraged to inform staff of any needs.   " No contact due to noted EPIC documentation of scheduled urgent care visit today on @1913 prior to Triage RN call back.    Per chart review pt seen at  today @1913.      Reason for Disposition   Patient already left for the hospital/clinic.     Pt seen in     Protocols used: No Contact or Duplicate Contact Call-A-AH

## 2025-01-19 NOTE — PROGRESS NOTES
Progress Note - Behavioral Health   Name: Sol Townsend 64 y.o. female I MRN: [id number]   Unit/Bed#: [facility] 658-02 I Date of Admission: 1/16/2025   Date of Service: 1/19/2025 I Hospital Day: 3         Assessment & Plan  Unspecified mood disorder vs MDD with psychotic features vs delusional disorder  Continue Lexapro 20 mg HS for depressive symptoms    Will hold off on starting an antipsychotic as patient was able to verbalize that she was scammed and no longer believes she was communicating with the lead tao of the [employer].     Neuropsych eval to rule out cognitive deficits  Vitamin B12 deficiency  SLIM managing   Vitamin D deficiency  SLIM managing         Recommended Treatment:    Treatment plan and medication changes discussed and per the attending physician the plan is:     1.Continue with group therapy, milieu therapy and occupational therapy  2.Behavioral Health checks every 15 minutes  3.Continue frequent safety checks and vitals per unit protocol  4.Continue with SLIM medical management as indicated  5.Continue with current medication regimen  6.Will review labs in the a.m.  7.Disposition Planning: Discharge planning and efforts remain ongoing     Planned medication and treatment changes:    All current active medications have been reviewed  Encourage group therapy, milieu therapy and occupational therapy  Behavioral Health checks for safety monitoring  Continue treatment with group therapy, milieu therapy and occupational therapy  Discharge planning  Consider mood stabilizer of patient is agreeable  Continue current medications:    Current medications:  Current Facility-Administered Medications   Medication Dose Route Frequency Provider Last Rate    acetaminophen  650 mg Oral Q4H PRN Kenzie Fairas MD      acetaminophen  650 mg Oral Q4H PRN Kenzie Farias MD      acetaminophen  975 mg Oral Q6H PRN Kenzie Farias MD      aluminum-magnesium hydroxide-simethicone  30 mL Oral Q4H PRN Kenize  MADDIE Farias MD      cyanocobalamin  1,000 mcg Oral Daily WALTER Cardenas      ergocalciferol  50,000 Units Oral Weekly WALTER Cardenas      escitalopram  20 mg Oral HS Kecia Dixon DO      haloperidol lactate  5 mg Intramuscular Q4H PRN Max 4/day Kenzie Farias MD      hydrOXYzine HCL  25 mg Oral Q6H PRN Max 4/day Kenzie Farias MD      hydrOXYzine HCL  50 mg Oral Q6H PRN Max 4/day Kenzie Farias MD      melatonin  3 mg Oral HS Kenzie Farias MD      nicotine polacrilex  4 mg Oral Q2H PRN Kenzie Farias MD      oxybutynin  5 mg Oral Daily WALTER Cardenas      propranolol  5 mg Oral Q8H PRN Kenzie Farias MD      risperiDONE  0.25 mg Oral Q4H PRN Max 6/day Kenzie Farias MD      risperiDONE  0.5 mg Oral Q4H PRN Max 3/day Kenzie Farias MD      risperiDONE  1 mg Oral Q2H PRN Max 3/day Kenzie Farias MD      traZODone  50 mg Oral Q6H PRN Max 3/day Kenzie Farias MD      traZODone  50 mg Oral HS PRN WALTER Kraus      ursodiol  300 mg Oral BID WALTER Cardenas         Risks / Benefits of Treatment:    Risks, benefits, and possible side effects of medications explained to patient and patient verbalizes understanding and agreement for treatment.    Subjective:    Behavior over the last 24 hours: unchanged.     Per staff, patient has been calm and cooperative on the unit. She has been visible on the unit and social with peers.      Sol was seen in the hallway away from peers for psychiatric follow up.  She continues to be anxious and hypertalkative.  She was perseverative about that fact that her daughter claimed she abandoned her grandchildren.  She was redirectable.  She spoke with her son today and reports the state police and FBI are aware of the scam that she was involved in.  She felt reassured by her son that she is not in trouble but will likely not get any of her money back.  She also continues to assert that she no longer wants  to be with her  and plans to divorce him.  She may benefit from a mood stabilizer for ruminations, racing thoughts and impulsivity.  She denies any suicidal or homicidal ideation intent or plan.  She reports her sleep improved last night with earplugs.  She denies auditory or visual hallucinations and does not appear to be internally preoccupied.     Sleep: slept off and on  Appetite: normal  Medication side effects: No   ROS: no complaints    Mental Status Evaluation:    Appearance:  adequate grooming, wearing hospital clothes   Behavior:  pleasant, cooperative, restless   Speech:  hypertalkative   Mood:  anxious   Affect:  expansive   Thought Process:  circumstantial   Associations: intact associations   Thought Content:  negative thoughts, ruminating thoughts   Perceptual Disturbances: none   Risk Potential: Suicidal ideation - None  Homicidal ideation - None  Potential for aggression - No   Sensorium:  oriented to person, place, and time/date   Memory:  recent memory intact   Consciousness:  alert and awake   Attention/Concentration: attention span and concentration appear shorter than expected for age   Insight:  partial   Judgment: limited   Gait/Station: normal gait/station   Motor Activity: no abnormal movements     Vital signs in last 24 hours:    Temp:  [97.6 °F (36.4 °C)-98.3 °F (36.8 °C)] 97.6 °F (36.4 °C)  HR:  [60-65] 60  BP: (134-168)/(68-78) 168/78  Resp:  [17-18] 17  SpO2:  [97 %-98 %] 98 %  O2 Device: None (Room air)         Laboratory results: I have personally reviewed all pertinent laboratory/tests results    Results from the past 24 hours: No results found for this or any previous visit (from the past 24 hours).  Most Recent Labs:   Lab Results   Component Value Date    WBC 4.73 01/17/2025    RBC 4.82 01/17/2025    HGB 13.8 01/17/2025    HCT 43.7 01/17/2025     01/17/2025    RDW 13.3 01/17/2025    NEUTROABS 2.22 01/17/2025    SODIUM 141 01/17/2025    K 3.6 01/17/2025      01/17/2025    CO2 27 01/17/2025    BUN 9 01/17/2025    CREATININE 0.89 01/17/2025    GLUC 97 01/17/2025    CALCIUM 9.1 01/17/2025    AST 25 01/17/2025    ALT 19 01/17/2025    ALKPHOS 64 01/17/2025    TP 7.2 01/17/2025    ALB 3.9 01/17/2025    TBILI 1.05 (H) 01/17/2025    CHOLESTEROL 170 01/17/2025    HDL 50 01/17/2025    TRIG 116 01/17/2025    LDLCALC 97 01/17/2025    NONHDLC 120 01/17/2025    [id number] 1.964 01/16/2025    SYPHILISAB Non-reactive 01/17/2025    HGBA1C 5.7 (H) 01/17/2025     01/17/2025       Suicide/Homicide Risk Assessment:    Risk of Harm to Self:   Nursing Suicide Risk Assessment Last 24 hours: C-SSRS Risk (Since Last Contact)  Calculated C-SSRS Risk Score (Since Last Contact): No Risk Indicated    Risk of Harm to Others:  Nursing Homicide Risk Assessment: Violence Risk to Others: Denies within past 6 months    The following interventions are recommended: Behavioral Health checks for safety monitoring, continued hospitalization on locked unit    Progress Toward Goals: progressing, attends groups, participates in milieu therapy, still anxious,hypertalkative, perseverative, sleep is improving,  compliant with meals and medication    Counseling / Coordination of Care:    Total floor / unit time spent today 30 minutes. Greater than 50% of total time was spent with the patient and / or family counseling and / or coordination of care. A description of counseling / coordination of care:  Patient's progress discussed with staff in treatment team meeting.    Administrative Statements   I have spent a total time of 30 minutes in caring for this patient on the day of the visit/encounter including Diagnostic results, Documenting in the medical record, Reviewing / ordering tests, medicine, procedures  , Obtaining or reviewing history  , and Communicating with other healthcare professionals .    WALTER Kraus 01/19/25

## 2025-01-20 PROCEDURE — 99232 SBSQ HOSP IP/OBS MODERATE 35: CPT | Performed by: PSYCHIATRY & NEUROLOGY

## 2025-01-20 RX ADMIN — URSODIOL 300 MG: 300 CAPSULE ORAL at 17:57

## 2025-01-20 RX ADMIN — ESCITALOPRAM OXALATE 20 MG: 10 TABLET ORAL at 21:02

## 2025-01-20 RX ADMIN — URSODIOL 300 MG: 300 CAPSULE ORAL at 08:35

## 2025-01-20 RX ADMIN — MELATONIN TAB 3 MG 3 MG: 3 TAB at 21:02

## 2025-01-20 RX ADMIN — OXYBUTYNIN CHLORIDE 5 MG: 5 TABLET, EXTENDED RELEASE ORAL at 08:35

## 2025-01-20 RX ADMIN — CYANOCOBALAMIN TAB 1000 MCG 1000 MCG: 1000 TAB at 08:35

## 2025-01-20 NOTE — PROGRESS NOTES
Pt did not fully attend group although came late in the am.      01/20/25 1000 01/20/25 1330   Activity/Group Checklist   Group Other (Comment)  (Community meeting: SMART goals and unit rules) Other (Comment)  (Journaling and mindfulness)   Attendance Attended;Other (Comment)  (late, stated she was sleeping) Did not attend

## 2025-01-20 NOTE — PLAN OF CARE
Problem: Alteration in Thoughts and Perception  Goal: Treatment Goal: Gain control of psychotic behaviors/thinking, reduce/eliminate presenting symptoms and demonstrate improved reality functioning upon discharge  Outcome: Progressing  Goal: Verbalize thoughts and feelings  Description: Interventions:  - Promote a nonjudgmental and trusting relationship with the patient through active listening and therapeutic communication  - Assess patient's level of functioning, behavior and potential for risk  - Engage patient in 1 on 1 interactions  - Encourage patient to express fears, feelings, frustrations, and discuss symptoms    - [place] patient to reality, help patient recognize reality-based thinking   - Administer medications as ordered and assess for potential side effects  - Provide the patient education related to the signs and symptoms of the illness and desired effects of prescribed medications  Outcome: Progressing  Goal: Refrain from acting on delusional thinking/internal stimuli  Description: Interventions:  - Monitor patient closely, per order   - Utilize least restrictive measures   - Set reasonable limits, give positive feedback for acceptable   - Administer medications as ordered and monitor of potential side effects  Outcome: Progressing     Problem: Risk for Self Injury/Neglect  Goal: Treatment Goal: Remain safe during length of stay, learn and adopt new coping skills, and be free of self-injurious ideation, impulses and acts at the time of discharge  Outcome: Progressing     Problem: Depression  Goal: Treatment Goal: Demonstrate behavioral control of depressive symptoms, verbalize feelings of improved mood/affect, and adopt new coping skills prior to discharge  Outcome: Progressing     Problem: Anxiety  Goal: Anxiety is at manageable level  Description: Interventions:  - Assess and monitor patient's anxiety level.   - Monitor for signs and symptoms (heart palpitations, chest pain, shortness of breath,  headaches, nausea, feeling jumpy, restlessness, irritable, apprehensive).   - Collaborate with interdisciplinary team and initiate plan and interventions as ordered.  - [place] patient to unit/surroundings  - Explain treatment plan  - Encourage participation in care  - Encourage verbalization of concerns/fears  - Identify coping mechanisms  - Assist in developing anxiety-reducing skills  - Administer/offer alternative therapies  - Limit or eliminate stimulants  Outcome: Progressing

## 2025-01-20 NOTE — PLAN OF CARE
Pt attends group and visible.     Problem: Alteration in Thoughts and Perception  Goal: Attend and participate in unit activities, including therapeutic, recreational, and educational groups  Description: Interventions:  -Encourage Visitation and family involvement in care  Outcome: Progressing

## 2025-01-20 NOTE — ASSESSMENT & PLAN NOTE
Continue Lexapro 20 mg HS for depressive symptoms    Will hold off on starting an antipsychotic as patient was able to verbalize that she was scammed and no longer believes she was communicating with the lead tao of the [employer].     Neuropsych eval and OT cog eval to rule out cognitive deficits, and determine if any safety concerns with patient living her alone or taking care of grandchildren in the future.
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
## 2025-01-20 NOTE — PROGRESS NOTES
"   01/20/25 0800   Team Meeting   Meeting Type Daily Rounds   Team Members Present   Team Members Present Physician;Nurse;   Physician Team Member Alphonso/Maricel/Js/Destiny   Nursing Team Member Bartolo/Ev   Care Management Team Member Han MARY   Patient/Family Present   Patient Present No   Patient's Family Present No     Patient is in need of code status. Patient has a neuro psych order in. She reports anxiety. She had a phone call with her son and is not talking to her daughter currently because she believed that she lied about her leaving the grandchildren at home. Daughter did in fact say that she \"misspoke\" about her leaving the children at home. Patient discharge is pending stabilization of mood and medications.   "

## 2025-01-20 NOTE — NURSING NOTE
"Patient visible throughout unit, interacting with staff and peers. Patient pleasant, denied SI/HI/AVH. Patient does not report anxiety, although patient appears anxious on assessment. Patient preoccupied with discharge, and \"getting out of here.\" Patient reassured. Patient displays steady gait. Patient accepted scheduled medications. Patient able to and encouraged to inform staff of any needs.   "

## 2025-01-20 NOTE — TREATMENT TEAM
Pt completed admission Bh relapse prevention.  Pt signed and copy in chart.  Crisis, wamline and [phone] numbers provided. Pharmacy [facility].  Provider Dr Katina Cabrera .  Pt attends groups.      01/20/25 1100   Activity/Group Checklist   Group Admission/Discharge   Attendance Attended   Attendance Duration (min) 16-30   Interactions Interacted appropriately   Affect/Mood Appropriate   Goals Achieved Identified feelings;Identified triggers;Identified relapse prevention strategies;Discussed coping strategies;Able to engage in interactions;Able to listen to others;Able to reflect/comment on own behavior;Able to manage/cope with feelings;Verbalized increased hopefulness;Able to self-disclose;Able to recieve feedback

## 2025-01-21 LAB
ALBUMIN SERPL BCG-MCNC: 3.7 G/DL (ref 3.5–5)
ALP SERPL-CCNC: 64 U/L (ref 34–104)
ALT SERPL W P-5'-P-CCNC: 18 U/L (ref 7–52)
ANION GAP SERPL CALCULATED.3IONS-SCNC: 7 MMOL/L (ref 4–13)
AST SERPL W P-5'-P-CCNC: 15 U/L (ref 13–39)
BILIRUB SERPL-MCNC: 0.57 MG/DL (ref 0.2–1)
BUN SERPL-MCNC: 14 MG/DL (ref 5–25)
CALCIUM SERPL-MCNC: 9 MG/DL (ref 8.4–10.2)
CHLORIDE SERPL-SCNC: 104 MMOL/L (ref 96–108)
CO2 SERPL-SCNC: 30 MMOL/L (ref 21–32)
CREAT SERPL-MCNC: 0.97 MG/DL (ref 0.6–1.3)
GFR SERPL CREATININE-BSD FRML MDRD: 62 ML/MIN/1.73SQ M
GLUCOSE P FAST SERPL-MCNC: 98 MG/DL (ref 65–99)
GLUCOSE SERPL-MCNC: 98 MG/DL (ref 65–140)
POTASSIUM SERPL-SCNC: 4.5 MMOL/L (ref 3.5–5.3)
PROT SERPL-MCNC: 6.8 G/DL (ref 6.4–8.4)
SODIUM SERPL-SCNC: 141 MMOL/L (ref 135–147)

## 2025-01-21 PROCEDURE — 80053 COMPREHEN METABOLIC PANEL: CPT | Performed by: NURSE PRACTITIONER

## 2025-01-21 PROCEDURE — 97167 OT EVAL HIGH COMPLEX 60 MIN: CPT

## 2025-01-21 PROCEDURE — 99232 SBSQ HOSP IP/OBS MODERATE 35: CPT | Performed by: PSYCHIATRY & NEUROLOGY

## 2025-01-21 RX ADMIN — MELATONIN TAB 3 MG 3 MG: 3 TAB at 21:02

## 2025-01-21 RX ADMIN — URSODIOL 300 MG: 300 CAPSULE ORAL at 08:44

## 2025-01-21 RX ADMIN — TRAZODONE HYDROCHLORIDE 50 MG: 50 TABLET ORAL at 23:37

## 2025-01-21 RX ADMIN — ESCITALOPRAM OXALATE 20 MG: 10 TABLET ORAL at 21:02

## 2025-01-21 RX ADMIN — OXYBUTYNIN CHLORIDE 5 MG: 5 TABLET, EXTENDED RELEASE ORAL at 08:44

## 2025-01-21 RX ADMIN — CYANOCOBALAMIN TAB 1000 MCG 1000 MCG: 1000 TAB at 08:44

## 2025-01-21 RX ADMIN — URSODIOL 300 MG: 300 CAPSULE ORAL at 17:24
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
## 2025-01-21 NOTE — NURSING NOTE
Patient visible on the unit. Patient calm and cooperative. Patient is med compliant. Patient denies psych s/s. Appetite intact with 100% breakfast eaten. Denies unmet needs at this time. Continual safety checks ongoing.

## 2025-01-21 NOTE — NURSING NOTE
Patient is alert and visible in the milieu. Bright and social with peers. Denies all psych s/s. Medication compliant and cooperative with care. Safety precautions maintained.
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
## 2025-01-21 NOTE — PROGRESS NOTES
01/21/25 0800   Team Meeting   Meeting Type Daily Rounds   Team Members Present   Team Members Present Physician;Nurse;   Physician Team Member Alphonso/Maricel/Js/Destiny   Nursing Team Member Bartolo/Ev   Care Management Team Member Han MARY   Patient/Family Present   Patient Present No   Patient's Family Present No     Patient denies all signs and symptoms. Likely discharge for Wednesday. She is calm and compliant with care at this time. Sleeping and eating well. Patient discharge is pending stabilization of mood and medications.
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
## 2025-01-21 NOTE — ASSESSMENT & PLAN NOTE
Continue Lexapro 20 mg HS for depressive symptoms    Will hold off on starting an antipsychotic as patient was able to verbalize that she was scammed and no longer believes she was communicating with the lead tao of the [employer].     Neuropsychiatric eval and OT cognitive eval did not reveal significant impairment and patient is able to live alone and maintain a job safely.

## 2025-01-21 NOTE — PLAN OF CARE
Problem: Alteration in Thoughts and Perception  Goal: Treatment Goal: Gain control of psychotic behaviors/thinking, reduce/eliminate presenting symptoms and demonstrate improved reality functioning upon discharge  Outcome: Progressing  Goal: Verbalize thoughts and feelings  Description: Interventions:  - Promote a nonjudgmental and trusting relationship with the patient through active listening and therapeutic communication  - Assess patient's level of functioning, behavior and potential for risk  - Engage patient in 1 on 1 interactions  - Encourage patient to express fears, feelings, frustrations, and discuss symptoms    - [place] patient to reality, help patient recognize reality-based thinking   - Administer medications as ordered and assess for potential side effects  - Provide the patient education related to the signs and symptoms of the illness and desired effects of prescribed medications  Outcome: Progressing  Goal: Refrain from acting on delusional thinking/internal stimuli  Description: Interventions:  - Monitor patient closely, per order   - Utilize least restrictive measures   - Set reasonable limits, give positive feedback for acceptable   - Administer medications as ordered and monitor of potential side effects  Outcome: Progressing  Goal: Attend and participate in unit activities, including therapeutic, recreational, and educational groups  Description: Interventions:  -Encourage Visitation and family involvement in care  Outcome: Progressing     Problem: Risk for Self Injury/Neglect  Goal: Treatment Goal: Remain safe during length of stay, learn and adopt new coping skills, and be free of self-injurious ideation, impulses and acts at the time of discharge  Outcome: Progressing     Problem: Depression  Goal: Treatment Goal: Demonstrate behavioral control of depressive symptoms, verbalize feelings of improved mood/affect, and adopt new coping skills prior to discharge  Outcome: Progressing      Problem: Anxiety  Goal: Anxiety is at manageable level  Description: Interventions:  - Assess and monitor patient's anxiety level.   - Monitor for signs and symptoms (heart palpitations, chest pain, shortness of breath, headaches, nausea, feeling jumpy, restlessness, irritable, apprehensive).   - Collaborate with interdisciplinary team and initiate plan and interventions as ordered.  - [place] patient to unit/surroundings  - Explain treatment plan  - Encourage participation in care  - Encourage verbalization of concerns/fears  - Identify coping mechanisms  - Assist in developing anxiety-reducing skills  - Administer/offer alternative therapies  - Limit or eliminate stimulants  Outcome: Progressing     Problem: Knowledge Deficit  Goal: Patient/family/caregiver demonstrates understanding of disease process, treatment plan, medications, and discharge instructions  Description: Complete learning assessment and assess knowledge base.  Interventions:  - Provide teaching at level of understanding  - Provide teaching via preferred learning methods  Outcome: Progressing     Problem: DISCHARGE PLANNING - CARE MANAGEMENT  Goal: Discharge to post-acute care or home with appropriate resources  Description: INTERVENTIONS:  - Conduct assessment to determine patient/family and health care team treatment goals, and need for post-acute services based on payer coverage, community resources, and patient preferences, and barriers to discharge  - Address psychosocial, clinical, and financial barriers to discharge as identified in assessment in conjunction with the patient/family and health care team  - Arrange appropriate level of post-acute services according to patient’s   needs and preference and payer coverage in collaboration with the physician and health care team  - Communicate with and update the patient/family, physician, and health care team regarding progress on the discharge plan  - Arrange appropriate transportation to  post-acute venues  Outcome: Progressing

## 2025-01-21 NOTE — CASE MANAGEMENT
Call made to pt's dtr Jennifer tel# [phone], reviewed pt's referral to financial counselor. Jennifer reports pt has plans to reside with [facility] at [address] [place] [address]  CM reviewed option for [facility] funding and pt referred for MA through financial counselors. CM reviewed MA application process and informed Jennifer of need for pt to provide requested documents for MA application to be completed. CM reviewed list of needed documents. Jennifer in agreement with pt's d/c tomorrow. CM to contact Jennifer once appts are scheduled through [facility] and then pt's d/c transport will be arranged. Jennifer reports pt uses [facility] in [place], Dtr reports their pharmacy tends to not have meds in stock; requesting pt's d/c meds be sent to [facility] pharmacy. Jennifer to give update to pt's son.   CM to contact pt's dtr once OP appts are set for pt's  time.

## 2025-01-21 NOTE — PROGRESS NOTES
01/21/25 1300   Activity/Group Checklist   Group Pet therapy   Attendance Attended   Attendance Duration (min) 31-45   Interactions Interacted appropriately   Affect/Mood Appropriate;Bright;Calm;Normal range   Goals Achieved Able to manage/cope with feelings;Able to listen to others;Able to engage in interactions

## 2025-01-21 NOTE — PROGRESS NOTES
"Pt attended all groups.  Pt able to self express and display positive thinking patterns.  Pt noted her goal was d/c, pay money back and work FT.  Pt did express concern for sister who was homeless (unsure of her address) and stated if she had a million dollars she would have a place to house the homeless.     01/21/25 1000 01/21/25 1330   Activity/Group Checklist   Group Other (Comment)  (Group Art Therapy/Psychodynamic, Open Choice followed by Process Discussion) Other (Comment)  (Self esteem: goals, expression, socialization \"ice breaker\" and commonality)   Attendance Attended Attended   Attendance Duration (min) Greater than 60  (90 min) 31-45   Interactions Interacted appropriately Interacted appropriately   Affect/Mood Appropriate Bright   Goals Achieved Discussed coping strategies;Able to listen to others;Able to engage in interactions;Able to recieve feedback;Able to give feedback to another Identified feelings;Identified triggers;Discussed self-esteem issues;Discussed coping strategies;Able to reflect/comment on own behavior;Able to engage in interactions;Identified resources and support systems;Able to listen to others;Able to self-disclose;Verbalized increased hopefulness;Able to manage/cope with feelings         "

## 2025-01-21 NOTE — PROGRESS NOTES
Progress Note - Behavioral Health   Name: Sol Townsend 64 y.o. female I MRN: [id number]  Unit/Bed#: [facility] 658-02 I Date of Admission: 1/16/2025   Date of Service: 1/21/2025 I Hospital Day: 5     Assessment & Plan  Unspecified mood disorder vs MDD with psychotic features vs delusional disorder  Continue Lexapro 20 mg HS for depressive symptoms    Will hold off on starting an antipsychotic as patient was able to verbalize that she was scammed and no longer believes she was communicating with the lead tao of the [employer].     Neuropsychiatric eval and OT cognitive eval did not reveal significant impairment and patient is able to live alone and maintain a job safely.  Vitamin B12 deficiency  SLIM managing   Vitamin D deficiency  SLIM managing     Current medications:  Current Facility-Administered Medications   Medication Dose Route Frequency Provider Last Rate    acetaminophen  650 mg Oral Q4H PRN Kenzie Farias MD      acetaminophen  650 mg Oral Q4H PRN Kenzie Farias MD      acetaminophen  975 mg Oral Q6H PRN Kenzie Farias MD      aluminum-magnesium hydroxide-simethicone  30 mL Oral Q4H PRN Kenzie Farias MD      cyanocobalamin  1,000 mcg Oral Daily WALTER Cardenas      ergocalciferol  50,000 Units Oral Weekly WALTER Cardenas      escitalopram  20 mg Oral HS Kecia Dixon DO      haloperidol lactate  5 mg Intramuscular Q4H PRN Max 4/day Kenzie Farias MD      hydrOXYzine HCL  25 mg Oral Q6H PRN Max 4/day Kenzie Farias MD      hydrOXYzine HCL  50 mg Oral Q6H PRN Max 4/day Kenzie Farias MD      melatonin  3 mg Oral HS eKnzie Farias MD      nicotine polacrilex  4 mg Oral Q2H PRN Kenzie Farias MD      oxybutynin  5 mg Oral Daily WALTER Cardenas      propranolol  5 mg Oral Q8H PRN Kenzie Farias MD      risperiDONE  0.25 mg Oral Q4H PRN Max 6/day Kenzie Farias MD      risperiDONE  0.5 mg Oral Q4H PRN Max 3/day Kenzie Farias MD       risperiDONE  1 mg Oral Q2H PRN Max 3/day Kenzie Farias MD      traZODone  50 mg Oral Q6H PRN Max 3/day Kenzie Farias MD      traZODone  50 mg Oral HS PRN WALTER Kraus      ursodiol  300 mg Oral BID WALTER Cardenas         Risks / Benefits of Treatment:    Risks, benefits, and possible side effects of medications explained to patient and patient verbalizes understanding and agreement for treatment.    Subjective:    Behavior over the last 24 hours: improving.     The patient was evaluated this morning for continuity of care and no acute distress noted throughout the evaluation.  Over the past 24 hours staff noted the patient was denying symptoms and cooperative with care.      Patient was visited on unit for continuing care; chart reviewed and discussed with multidisciplinary treatment team.  On approach, the patient was calm and cooperative. Denied any changes in mood, appetite, and energy level. No problem initiating and maintaining sleep.  Denied A/VH currently.  Denied SI/HI, intent or plan upon direct inquiry at this time.  Slightly anxious today due to having no belongings with her and needing to live with her friend due to pending divorce with her .    Patient continues to be visible in the milieu and interacts with staff and peers. No reports of aggression or self-injurious behavior on unit. No PRN medications used in the past 24 hours.    Patient accepted all offered medications and no adverse effects of medications noted or reported.    Sleep: normal  Appetite: normal  Medication side effects: No   ROS: no complaints    Mental Status Examination:  Appearance:  age appropriate, casually dressed, looks stated age   Behavior:  cooperative, calm, psychomotor retardation, slow responses   Speech:  normal rate and volume   Mood:  anxious   Affect:  normal range and intensity   Thought Process:  logical, coherent, linear, negative thinking   Associations: intact associations    Thought Content:  no overt delusions, ruminating thoughts   Perceptual Disturbances: no auditory hallucinations, no visual hallucinations, does not appear responding to internal stimuli   Risk Potential: Suicidal ideation - None at present  Homicidal ideation - None at present  Potential for aggression - No   Sensorium:  oriented to person, place, and time/date   Memory:  recent and remote memory grossly intact   Consciousness:  alert and awake   Attention/Concentration: attention span and concentration are age appropriate   Insight:  limited   Judgment: limited   Gait/Station: normal gait/station   Motor Activity: no abnormal movements        Vital signs in last 24 hours:    Temp:  [97.6 °F (36.4 °C)-98.4 °F (36.9 °C)] 97.6 °F (36.4 °C)  HR:  [69-72] 72  BP: (111-143)/(68-84) 111/84  Resp:  [18-19] 19  SpO2:  [96 %-97 %] 96 %  O2 Device: None (Room air)         Laboratory results: I have personally reviewed all pertinent laboratory/tests results    Most Recent Labs:   Lab Results   Component Value Date    WBC 4.73 01/17/2025    RBC 4.82 01/17/2025    HGB 13.8 01/17/2025    HCT 43.7 01/17/2025     01/17/2025    RDW 13.3 01/17/2025    NEUTROABS 2.22 01/17/2025    SODIUM 141 01/21/2025    K 4.5 01/21/2025     01/21/2025    CO2 30 01/21/2025    BUN 14 01/21/2025    CREATININE 0.97 01/21/2025    GLUC 98 01/21/2025    CALCIUM 9.0 01/21/2025    AST 15 01/21/2025    ALT 18 01/21/2025    ALKPHOS 64 01/21/2025    TP 6.8 01/21/2025    ALB 3.7 01/21/2025    TBILI 0.57 01/21/2025    CHOLESTEROL 170 01/17/2025    HDL 50 01/17/2025    TRIG 116 01/17/2025    LDLCALC 97 01/17/2025    NONHDLC 120 01/17/2025    NRU9HCLUOMDJ 1.964 01/16/2025    SYPHILISAB Non-reactive 01/17/2025    HGBA1C 5.7 (H) 01/17/2025     01/17/2025       Progress Toward Goals: improving, patient no longer believes she is dating a lead tao of a rock band.  She was able to understand that she was scammed by someone online.  She states  that she will no longer message people on social media who she does not know.  Per neuropsych and OT cognitive evaluations patient does not have any concerning cognitive deficits at this time.  She is able to return home with outpatient care pending no change in her symptoms, discharge plan for tomorrow.    Counseling / Coordination of Care:    Patient's progress discussed with staff in treatment team meeting.  Medication changes reviewed with staff in treatment team meeting.  Patient's progress reviewed with nursing staff.  Medications, treatment progress and treatment plan reviewed with patient.  Medication changes discussed with patient.  Medication education provided to patient.  Patient's progress reviewed with case management staff.  Reassurance and supportive therapy provided.}    Kecia Dixon, DO 01/21/25    This note was completed in part utilizing Dragon dictation Software. Grammatical, translation, syntax errors, random word insertions, spelling mistakes, and incomplete sentences may be an occasional consequence of this system secondary to software limitations with voice recognition, ambient noise, and hardware issues. If you have any questions or concerns about the content, text, or information contained within the body of this dictation, please contact the provider for clarification.

## 2025-01-22 VITALS
TEMPERATURE: 97.6 F | WEIGHT: 196.13 LBS | DIASTOLIC BLOOD PRESSURE: 64 MMHG | HEIGHT: 65 IN | BODY MASS INDEX: 32.68 KG/M2 | HEART RATE: 71 BPM | RESPIRATION RATE: 18 BRPM | OXYGEN SATURATION: 96 % | SYSTOLIC BLOOD PRESSURE: 127 MMHG

## 2025-01-22 PROBLEM — Z00.8 MEDICAL CLEARANCE FOR PSYCHIATRIC ADMISSION: Status: RESOLVED | Noted: 2025-01-16 | Resolved: 2025-01-22

## 2025-01-22 PROCEDURE — 99238 HOSP IP/OBS DSCHRG MGMT 30/<: CPT | Performed by: PSYCHIATRY & NEUROLOGY

## 2025-01-22 RX ORDER — OXYBUTYNIN CHLORIDE 5 MG/1
5 TABLET, EXTENDED RELEASE ORAL DAILY
Qty: 30 TABLET | Refills: 1 | Status: SHIPPED | OUTPATIENT
Start: 2025-01-22 | End: 2025-03-23

## 2025-01-22 RX ORDER — URSODIOL 300 MG/1
300 CAPSULE ORAL 2 TIMES DAILY
Qty: 60 CAPSULE | Refills: 1 | Status: SHIPPED | OUTPATIENT
Start: 2025-01-22 | End: 2025-03-23

## 2025-01-22 RX ORDER — ESCITALOPRAM OXALATE 20 MG/1
20 TABLET ORAL
Qty: 30 TABLET | Refills: 1 | Status: SHIPPED | OUTPATIENT
Start: 2025-01-22 | End: 2025-03-23

## 2025-01-22 RX ORDER — ERGOCALCIFEROL 1.25 MG/1
50000 CAPSULE, LIQUID FILLED ORAL WEEKLY
Qty: 7 CAPSULE | Refills: 0 | Status: SHIPPED | OUTPATIENT
Start: 2025-01-24 | End: 2025-03-08

## 2025-01-22 RX ADMIN — OXYBUTYNIN CHLORIDE 5 MG: 5 TABLET, EXTENDED RELEASE ORAL at 09:12

## 2025-01-22 RX ADMIN — URSODIOL 300 MG: 300 CAPSULE ORAL at 09:12

## 2025-01-22 RX ADMIN — CYANOCOBALAMIN TAB 1000 MCG 1000 MCG: 1000 TAB at 09:12

## 2025-01-22 NOTE — BH TRANSITION RECORD
Contact Information: If you have any questions, concerns, pended studies, tests and/or procedures, or emergencies regarding your inpatient behavioral health visit. Please contact [facility] [phone] and ask to speak to a , nurse or physician. A contact is available 24 hours/ 7 days a week at this number.     Summary of Procedures Performed During your Stay:  Below is a list of major procedures performed during your hospital stay and a summary of results:  - Cardiac Procedures/Studies: EKG on 1/16/25: Normal sinus rhythm with .    Occupational therapy cognitive evaluation: The patient scored 5.6/6.0 indicating that they may live alone and work where hazards are predictable.  Recommend Pt have supervision for medication compliance. Recommend use of pill box to manage medications and weekly checks to ensure pill box is correct and Pt is taking meds.     Results of Neuropsychological Exam revealed cognitive deficits in mental flexibility/cognitive control and declarative recall with all other tested areas within normal limits. General Cognitive Functioning MMSE = Within Normal [id number];     Pending Studies (From admission, onward)      None          Please follow up on the above pending studies with your PCP and/or referring provider.
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
## 2025-01-22 NOTE — PROGRESS NOTES
01/22/25 0900   Team Meeting   Meeting Type Daily Rounds   Team Members Present   Team Members Present Physician;Nurse;;;Occupational Therapist   Physician Team Member nica/Shmuel/Js/Destiny   Nursing Team Member Bartolo/Ev   Care Management Team Member Efra/Leonard   Social Work Team Member Joshua   OT Team Member Isabel   Patient/Family Present   Patient Present No   Patient's Family Present No     Denies all symptoms.  Attended groups.  Trazodone PRN for sleep.  Concerned she doesn't have clothes for discharge.  Discharge today.

## 2025-01-22 NOTE — CASE MANAGEMENT
CM sent pt's AVS and pharmacy receipt to pt's son via secure email.    CM met with pt, reviewed d/c appts and plans. Pt in agreement with follow up appts.

## 2025-01-22 NOTE — PLAN OF CARE
Problem: Alteration in Thoughts and Perception  Goal: Treatment Goal: Gain control of psychotic behaviors/thinking, reduce/eliminate presenting symptoms and demonstrate improved reality functioning upon discharge  Outcome: Completed  Goal: Verbalize thoughts and feelings  Description: Interventions:  - Promote a nonjudgmental and trusting relationship with the patient through active listening and therapeutic communication  - Assess patient's level of functioning, behavior and potential for risk  - Engage patient in 1 on 1 interactions  - Encourage patient to express fears, feelings, frustrations, and discuss symptoms    - Anderson patient to reality, help patient recognize reality-based thinking   - Administer medications as ordered and assess for potential side effects  - Provide the patient education related to the signs and symptoms of the illness and desired effects of prescribed medications  Outcome: Completed  Goal: Refrain from acting on delusional thinking/internal stimuli  Description: Interventions:  - Monitor patient closely, per order   - Utilize least restrictive measures   - Set reasonable limits, give positive feedback for acceptable   - Administer medications as ordered and monitor of potential side effects  Outcome: Completed  Goal: Attend and participate in unit activities, including therapeutic, recreational, and educational groups  Description: Interventions:  -Encourage Visitation and family involvement in care  Outcome: Completed     Problem: Depression  Goal: Treatment Goal: Demonstrate behavioral control of depressive symptoms, verbalize feelings of improved mood/affect, and adopt new coping skills prior to discharge  Outcome: Completed     Problem: Anxiety  Goal: Anxiety is at manageable level  Description: Interventions:  - Assess and monitor patient's anxiety level.   - Monitor for signs and symptoms (heart palpitations, chest pain, shortness of breath, headaches, nausea, feeling jumpy,  restlessness, irritable, apprehensive).   - Collaborate with interdisciplinary team and initiate plan and interventions as ordered.  - Mobile patient to unit/surroundings  - Explain treatment plan  - Encourage participation in care  - Encourage verbalization of concerns/fears  - Identify coping mechanisms  - Assist in developing anxiety-reducing skills  - Administer/offer alternative therapies  - Limit or eliminate stimulants  Outcome: Completed     Problem: Knowledge Deficit  Goal: Patient/family/caregiver demonstrates understanding of disease process, treatment plan, medications, and discharge instructions  Description: Complete learning assessment and assess knowledge base.  Interventions:  - Provide teaching at level of understanding  - Provide teaching via preferred learning methods  Outcome: Completed     Problem: DISCHARGE PLANNING - CARE MANAGEMENT  Goal: Discharge to post-acute care or home with appropriate resources  Description: INTERVENTIONS:  - Conduct assessment to determine patient/family and health care team treatment goals, and need for post-acute services based on payer coverage, community resources, and patient preferences, and barriers to discharge  - Address psychosocial, clinical, and financial barriers to discharge as identified in assessment in conjunction with the patient/family and health care team  - Arrange appropriate level of post-acute services according to patient’s   needs and preference and payer coverage in collaboration with the physician and health care team  - Communicate with and update the patient/family, physician, and health care team regarding progress on the discharge plan  - Arrange appropriate transportation to post-acute venues  Outcome: Completed

## 2025-01-22 NOTE — ASSESSMENT & PLAN NOTE
Continue Lexapro 20 mg HS for depressive symptoms    Neuropsychiatric eval and OT cognitive eval did not reveal significant impairment and patient is able to live alone and maintain a job safely.

## 2025-01-22 NOTE — NURSING NOTE
Patient withdrawn to her room and only came out for snack. She is pleasant on approach and compliant with medications. She currently denies all psych s/s. Will monitor for safety and support.

## 2025-01-22 NOTE — CASE MANAGEMENT
CM rec'd email from Francia at [facility]; pt scheduled OP MH follow up with [facility] on 2/3/25 at 1130 AM.     [facility] sent pt's clinical to Francia via secure email.

## 2025-01-22 NOTE — CASE MANAGEMENT
[facility]; pt's meds have copay $21.63    Call made to pt's dtr tamera Rodriguez requesting c/b.    Call made to pt's son Hammad tel# [phone], reviewed pt's d/c today and conversation with pt. CM informed Hammad of d/c plan and arrangements with Milvia Bill  for pt's OP MH follow up with [facility]. CM also reviewed pt's need to complete MA application. CM reviewed pt's copay for meds. Pt's son to call pharmacy and provide payment for pt's meds. Pt's son reports pt's niece Razia Fontaine will be picking up pt today at 4:15 pm. CM to send copy of pt's AVS to pt's son via email: [email or web address]  Pt's son reports plans to ensure pt will follow up with MA application and appts. CM reviewed pt's OT and neuropsych consults and recommendation for family to assist with weekly pill box for pt's meds and medication compliance. Pt's son reports family will provide assistance and reminders.

## 2025-01-22 NOTE — PLAN OF CARE
Problem: Alteration in Thoughts and Perception  Goal: Treatment Goal: Gain control of psychotic behaviors/thinking, reduce/eliminate presenting symptoms and demonstrate improved reality functioning upon discharge  Outcome: Adequate for Discharge  Goal: Verbalize thoughts and feelings  Description: Interventions:  - Promote a nonjudgmental and trusting relationship with the patient through active listening and therapeutic communication  - Assess patient's level of functioning, behavior and potential for risk  - Engage patient in 1 on 1 interactions  - Encourage patient to express fears, feelings, frustrations, and discuss symptoms    - [place] patient to reality, help patient recognize reality-based thinking   - Administer medications as ordered and assess for potential side effects  - Provide the patient education related to the signs and symptoms of the illness and desired effects of prescribed medications  Outcome: Adequate for Discharge  Goal: Refrain from acting on delusional thinking/internal stimuli  Description: Interventions:  - Monitor patient closely, per order   - Utilize least restrictive measures   - Set reasonable limits, give positive feedback for acceptable   - Administer medications as ordered and monitor of potential side effects  Outcome: Adequate for Discharge  Goal: Attend and participate in unit activities, including therapeutic, recreational, and educational groups  Description: Interventions:  -Encourage Visitation and family involvement in care  Outcome: Adequate for Discharge     Problem: Risk for Self Injury/Neglect  Goal: Treatment Goal: Remain safe during length of stay, learn and adopt new coping skills, and be free of self-injurious ideation, impulses and acts at the time of discharge  Outcome: Adequate for Discharge     Problem: Depression  Goal: Treatment Goal: Demonstrate behavioral control of depressive symptoms, verbalize feelings of improved mood/affect, and adopt new coping  skills prior to discharge  Outcome: Adequate for Discharge     Problem: Anxiety  Goal: Anxiety is at manageable level  Description: Interventions:  - Assess and monitor patient's anxiety level.   - Monitor for signs and symptoms (heart palpitations, chest pain, shortness of breath, headaches, nausea, feeling jumpy, restlessness, irritable, apprehensive).   - Collaborate with interdisciplinary team and initiate plan and interventions as ordered.  - [place] patient to unit/surroundings  - Explain treatment plan  - Encourage participation in care  - Encourage verbalization of concerns/fears  - Identify coping mechanisms  - Assist in developing anxiety-reducing skills  - Administer/offer alternative therapies  - Limit or eliminate stimulants  Outcome: Adequate for Discharge     Problem: Knowledge Deficit  Goal: Patient/family/caregiver demonstrates understanding of disease process, treatment plan, medications, and discharge instructions  Description: Complete learning assessment and assess knowledge base.  Interventions:  - Provide teaching at level of understanding  - Provide teaching via preferred learning methods  Outcome: Adequate for Discharge     Problem: DISCHARGE PLANNING - CARE MANAGEMENT  Goal: Discharge to post-acute care or home with appropriate resources  Description: INTERVENTIONS:  - Conduct assessment to determine patient/family and health care team treatment goals, and need for post-acute services based on payer coverage, community resources, and patient preferences, and barriers to discharge  - Address psychosocial, clinical, and financial barriers to discharge as identified in assessment in conjunction with the patient/family and health care team  - Arrange appropriate level of post-acute services according to patient’s   needs and preference and payer coverage in collaboration with the physician and health care team  - Communicate with and update the patient/family, physician, and health care team  regarding progress on the discharge plan  - Arrange appropriate transportation to post-acute venues  Outcome: Adequate for Discharge

## 2025-01-22 NOTE — PROGRESS NOTES
01/22/25 1100   Activity/Group Checklist   Group Life Skills  (topic coping with disappointment)   Attendance Attended   Attendance Duration (min) 46-60   Interactions Interacted appropriately  (Pt. receptive to handout and discusson on coping options for disappointments.)   Affect/Mood Appropriate;Calm;Normal range   Goals Achieved Able to engage in interactions;Discussed coping strategies

## 2025-01-22 NOTE — PLAN OF CARE
Problem: Alteration in Thoughts and Perception  Goal: Verbalize thoughts and feelings  Description: Interventions:  - Promote a nonjudgmental and trusting relationship with the patient through active listening and therapeutic communication  - Assess patient's level of functioning, behavior and potential for risk  - Engage patient in 1 on 1 interactions  - Encourage patient to express fears, feelings, frustrations, and discuss symptoms    - [place] patient to reality, help patient recognize reality-based thinking   - Administer medications as ordered and assess for potential side effects  - Provide the patient education related to the signs and symptoms of the illness and desired effects of prescribed medications  Outcome: Progressing  Goal: Attend and participate in unit activities, including therapeutic, recreational, and educational groups  Description: Interventions:  -Encourage Visitation and family involvement in care  Outcome: Progressing     Problem: Risk for Self Injury/Neglect  Goal: Treatment Goal: Remain safe during length of stay, learn and adopt new coping skills, and be free of self-injurious ideation, impulses and acts at the time of discharge  Outcome: Progressing     Problem: Depression  Goal: Treatment Goal: Demonstrate behavioral control of depressive symptoms, verbalize feelings of improved mood/affect, and adopt new coping skills prior to discharge  Outcome: Progressing     Problem: Anxiety  Goal: Anxiety is at manageable level  Description: Interventions:  - Assess and monitor patient's anxiety level.   - Monitor for signs and symptoms (heart palpitations, chest pain, shortness of breath, headaches, nausea, feeling jumpy, restlessness, irritable, apprehensive).   - Collaborate with interdisciplinary team and initiate plan and interventions as ordered.  - [place] patient to unit/surroundings  - Explain treatment plan  - Encourage participation in care  - Encourage verbalization of  concerns/fears  - Identify coping mechanisms  - Assist in developing anxiety-reducing skills  - Administer/offer alternative therapies  - Limit or eliminate stimulants  Outcome: Progressing

## 2025-01-22 NOTE — DISCHARGE INSTR - OTHER ORDERS
You are being discharged to friend Staci's residence: [address], [place]    If you are unable to deal with your distressed mood alone please contact [facility] Crisis # [phone], dial [phone] or go to the nearest emergency center.

## 2025-01-22 NOTE — DISCHARGE INSTR - APPOINTMENTS
Behavioral Health Nurse Navigator, Klaudia or Jennifer will be calling you after your discharge, on the phone number that you provided.  They will be available as an additional support, if needed.   If you wish to speak with Klaudia, you may contact her at [phone].

## 2025-01-22 NOTE — NURSING NOTE
Patient visible on the unit. Patient calm and cooperative. Patient is med compliant. Patient denies psych s/s. Appetite intact with 100% breakfast eaten. Denies unmet needs at this time. Continual safety checks ongoing

## 2025-01-22 NOTE — NURSING NOTE
Patient discharged to home at 1720 accompanied by her niece. Orders and discharge instructions reviewed with patient and was able to verbalize understanding. All belongings and home meds returned to patient.